# Patient Record
Sex: FEMALE | Race: WHITE | Employment: FULL TIME | URBAN - METROPOLITAN AREA
[De-identification: names, ages, dates, MRNs, and addresses within clinical notes are randomized per-mention and may not be internally consistent; named-entity substitution may affect disease eponyms.]

---

## 2017-02-27 ENCOUNTER — DOCUMENTATION ONLY (OUTPATIENT)
Dept: FAMILY MEDICINE | Facility: CLINIC | Age: 52
End: 2017-02-27

## 2017-02-27 NOTE — PROGRESS NOTES
Panel Management Review      Patient has the following on her problem list: None      Composite cancer screening  Chart review shows that this patient is due/due soon for the following Pap Smear and Mammogram  Summary:    Patient is due/failing the following:   MAMMOGRAM and PAP    Action needed:   Patient needs office visit for PAP & orders for mammogram.    Type of outreach:    Sent [employer] message.    Questions for provider review:    None                                                                                                                                    Gloria Osman CMA (AAMA) 2/27/2017 1:43 PM       Chart routed to  .

## 2017-04-05 NOTE — PROGRESS NOTES
SUBJECTIVE:     CC: Graeme Johnson is an 51 year old woman who presents for preventive health visit.     Physical   Annual:     Getting at least 3 servings of Calcium per day::  Yes    Bi-annual eye exam::  Yes    Dental care twice a year::  Yes    Sleep apnea or symptoms of sleep apnea::  Daytime drowsiness    Diet::  Regular (no restrictions)    Frequency of exercise::  6-7 days/week    Duration of exercise::  30-45 minutes    Taking medications regularly::  Yes    Medication side effects::  None    Additional concerns today::  YES        Discuss hip issues.    Today's PHQ-2 Score:   PHQ-2 ( 1999 Pfizer) 4/4/2017   Q1: Little interest or pleasure in doing things -   Q2: Feeling down, depressed or hopeless -   PHQ-2 Score -   Little interest or pleasure in doing things Not at all   Feeling down, depressed or hopeless Not at all   PHQ-2 Score 0       Abuse: Current or Past(Physical, Sexual or Emotional)- No  Do you feel safe in your environment - Yes    Social History   Substance Use Topics     Smoking status: Former Smoker     Quit date: 1/1/1979     Smokeless tobacco: Never Used      Comment: smoked a little as a teenager     Alcohol use 1.0 - 1.5 oz/week     2 - 3 Standard drinks or equivalent per week      Comment: 1/2 glass wine/day     The patient does not drink >3 drinks per day nor >7 drinks per week.    Recent Labs   Lab Test  12/24/14   0951   CHOL  184   HDL  98   LDL  74   TRIG  61   CHOLHDLRATIO  1.9       Reviewed orders with patient.  Reviewed health maintenance and updated orders accordingly - Yes    Mammo Decision Support:      Pertinent mammograms are reviewed under the imaging tab.  History of abnormal Pap smear: NO - age 30- 65 PAP every 3 years recommended    Reviewed and updated as needed this visit by clinical staff         Reviewed and updated as needed this visit by Provider        Patient Active Problem List   Diagnosis     CARDIOVASCULAR SCREENING; LDL GOAL LESS THAN 160     Allergic  state     Left hip pain     Left shoulder pain     Pain in joint, pelvic region and thigh     Ischial bursitis     Acetabular labrum tear     Primary osteoarthritis of left hip     AIN grade II       Past Medical History:   Diagnosis Date     AIN grade II      Allergy        Past Surgical History:   Procedure Laterality Date     APPENDECTOMY       BIOPSY BREAST       C NONSPECIFIC PROCEDURE      PE tubes     C NONSPECIFIC PROCEDURE      [place] teeth     COLONOSCOPY N/A 2015    Procedure: COMBINED COLONOSCOPY, SINGLE OR MULTIPLE BIOPSY/POLYPECTOMY BY BIOPSY;  Surgeon: Gonzalez Emmanuel MD, MD;  Location:  GI     ENT SURGERY      tonsilectomy 1969     SURGICAL HISTORY OF -       venous ablation right leg       Obstetric History       T3      TAB1   SAB0   E0   M0   L3       # Outcome Date GA Lbr Gonsalo/2nd Weight Sex Delivery Anes PTL Lv   4 TAB            3 Term            2 Term            1 Term                   Current Outpatient Prescriptions   Medication Sig Dispense Refill     fluticasone (FLONASE) 50 MCG/ACT nasal spray Spray 2 sprays into both nostrils daily 16 g 3     multivitamin, therapeutic with minerals (MULTI-VITAMIN) TABS Take 1 tablet by mouth daily 100 tablet 3     NEW MED Calcium supplement.  Unsure of dose       omega 3 1000 MG CAPS Take 1 g by mouth daily 90 capsule 0     Cetirizine HCl (ZYRTEC ALLERGY PO) Take  by mouth. 1 tablet daily as needed         Family History   Problem Relation Age of Onset     Alcohol/Drug Father      CANCER Father      skin     Hypertension Father      Lipids Father      Neurologic Disorder Father      TIA's; dementia     Respiratory Father      copd     HEART DISEASE Father      CHF; a. fib/brain hemorrhage     Hypertension Maternal Grandmother      HEART DISEASE Maternal Grandmother      brain hemorrhage     DIABETES Maternal Grandfather      C.A.D. Maternal Grandfather      CANCER Maternal Grandfather      Bladder     Psychotic Disorder  "Sister      depression       Social History   Substance Use Topics     Smoking status: Former Smoker     Quit date: 1/1/1979     Smokeless tobacco: Never Used      Comment: smoked a little as a teenager     Alcohol use 1.0 - 1.5 oz/week     2 - 3 Standard drinks or equivalent per week      Comment: 1 glass per week       Immunization History   Administered Date(s) Administered     TD (ADULT, 7+) 02/05/1998     TDAP Vaccine (Adacel) 05/04/2010       ROS:  C: NEGATIVE for fever, chills, change in weight  I: NEGATIVE for worrisome rashes, moles or lesions; does have skin check next week.  E: NEGATIVE for vision changes or irritation  ENT: NEGATIVE for ear, mouth and throat problems  R: NEGATIVE for significant cough or short of breath x see below.  B: NEGATIVE for masses, tenderness or discharge  CV: NEGATIVE for chest pain, palpitations or peripheral edema  GI: NEGATIVE for nausea, abdominal pain, heartburn, or change in bowel habits  : NEGATIVE for unusual urinary or vaginal symptoms. Regular periods.  M: NEGATIVE for significant arthralgias or myalgia. Once in awhile will get some right hip pain and affects gait. SI seems locked up at same time.   N: NEGATIVE for weakness, dizziness or paresthesias  P: NEGATIVE for changes in mood or affect     Coughing since January. Does have tickles in throat. Suspects allergy.  Has also had a couple URIs with residual cough as well, so not sure if this contributes.    Anticipates getting anal pap smears annually; believes it will be coming up soon. She will be following up with colorectal for this.       OBJECTIVE:     /58 (BP Location: Right arm, Patient Position: Chair, Cuff Size: Adult Regular)  Pulse 57  Temp 97.7  F (36.5  C) (Oral)  Resp 16  Ht 5' 8.25\" (1.734 m)  Wt 142 lb 3.2 oz (64.5 kg)  SpO2 100%  BMI 21.46 kg/m2  EXAM:  GENERAL: healthy, alert and no distress  EYES: Eyes grossly normal to inspection, PERRL and conjunctivae and sclerae normal  HENT: ear " canals and TM's normal, nose and mouth without ulcers or lesions  NECK: no adenopathy, no asymmetry, masses, or scars and thyroid normal to palpation  RESP: lungs clear to auscultation - no rales, rhonchi or wheezes  BREAST: normal without masses, tenderness or nipple discharge and no palpable axillary masses or adenopathy  CV: regular rates and rhythm, peripheral pulses strong and no peripheral edema  ABDOMEN: soft, nontender, no hepatosplenomegaly, no masses and bowel sounds normal   (female): normal female external genitalia, normal urethral meatus, vaginal mucosa pink, moist, well rugated, and normal cervix/adnexa/uterus without masses or discharge  MS: no gross musculoskeletal defects noted, no edema  SKIN: no suspicious lesions or rashes  NEURO: Normal strength and tone, mentation intact and speech normal  PSYCH: mentation appears normal, affect normal/bright    Recent Labs   Lab Test  12/24/14   0951   CHOL  184   HDL  98   LDL  74   TRIG  61   CHOLHDLRATIO  1.9     Reviewed colorectal note.    ASSESSMENT/PLAN:       Encounter for routine adult health examination without abnormal findings      AIN grade II  she does note she will go in soon for anal pap.    Cough  seems more in the throat area.   At this time, encourage regular use of her anti histamine and the Flonase. Discussed timing as well.    Hip pain, right  she is thinking of chiropractic. Could consider physical therapy.  Discussed regular exercise and core strengthening.    Screening for cervical cancer    - Pap imaged thin layer screen with HPV - recommended age 30 - 65 years (select HPV order below)  - HPV High Risk Types DNA Cervical    Encounter for screening mammogram for breast cancer    - *MA Screening Digital Bilateral; Future  - Glucose; Future    Need for hepatitis C screening test    - **Hepatitis C Screen Reflex to RNA FUTURE anytime; Future    CARDIOVASCULAR SCREENING; LDL GOAL LESS THAN 160    - Lipid Profile with reflex to direct  "LDL; Future      COUNSELING:  Reviewed preventive health counseling, as reflected in patient instructions       Regular exercise       Healthy diet/nutrition       Osteoporosis Prevention/Bone Health         reports that she quit smoking about 38 years ago. She has never used smokeless tobacco.    Estimated body mass index is 22.28 kg/(m^2) as calculated from the following:    Height as of 4/1/16: 5' 8\" (1.727 m).    Weight as of 4/1/16: 146 lb 8 oz (66.5 kg).       Counseling Resources:  ATP IV Guidelines  Pooled Cohorts Equation Calculator  Breast Cancer Risk Calculator  FRAX Risk Assessment  ICSI Preventive Guidelines  Dietary Guidelines for Americans, 2010  [employer] MyPlate  ASA Prophylaxis  Lung CA Screening    Nessa Contreras MD, MD  [facility] [place]  Answers for HPI/ROS submitted by the patient on 4/4/2017   Q1: Little interest or pleasure in doing things: 0=Not at all  Q2: Feeling down, depressed or hopeless: 0=Not at all  PHQ-2 Score: 0    "

## 2017-04-05 NOTE — PATIENT INSTRUCTIONS
Please call [phone] to schedule your mammogram at the [place] location of your choice.    Preventive Health Recommendations  Female Ages 50 - 64    Yearly exam: See your health care provider every year in order to  o Review health changes.   o Discuss preventive care.    o Review your medicines if your doctor has prescribed any.      Get a Pap test every three years (unless you have an abnormal result and your provider advises testing more often).    If you get Pap tests with HPV test, you only need to test every 5 years, unless you have an abnormal result.     You do not need a Pap test if your uterus was removed (hysterectomy) and you have not had cancer.    You should be tested each year for STDs (sexually transmitted diseases) if you're at risk.     Have a mammogram every 1 to 2 years.    Have a colonoscopy at age 50, or have a yearly FIT test (stool test). These exams screen for colon cancer.      Have a cholesterol test every 5 years, or more often if advised.    Have a diabetes test (fasting glucose) every three years. If you are at risk for diabetes, you should have this test more often.     If you are at risk for osteoporosis (brittle bone disease), think about having a bone density scan (DEXA).    Shots: Get a flu shot each year. Get a tetanus shot every 10 years.    Nutrition:     Eat at least 5 servings of fruits and vegetables each day.    Eat whole-grain bread, whole-wheat pasta and brown rice instead of white grains and rice.    Talk to your provider about Calcium and Vitamin D.     Lifestyle    Exercise at least 150 minutes a week (30 minutes a day, 5 days a week). This will help you control your weight and prevent disease.    Limit alcohol to one drink per day.    No smoking.     Wear sunscreen to prevent skin cancer.     See your dentist every six months for an exam and cleaning.    See your eye doctor every 1 to 2 years.

## 2017-04-06 ENCOUNTER — OFFICE VISIT (OUTPATIENT)
Dept: FAMILY MEDICINE | Facility: CLINIC | Age: 52
End: 2017-04-06
Payer: COMMERCIAL

## 2017-04-06 VITALS
TEMPERATURE: 97.7 F | DIASTOLIC BLOOD PRESSURE: 58 MMHG | OXYGEN SATURATION: 100 % | BODY MASS INDEX: 21.55 KG/M2 | SYSTOLIC BLOOD PRESSURE: 118 MMHG | WEIGHT: 142.2 LBS | HEIGHT: 68 IN | RESPIRATION RATE: 16 BRPM | HEART RATE: 57 BPM

## 2017-04-06 DIAGNOSIS — Z00.00 ENCOUNTER FOR ROUTINE ADULT HEALTH EXAMINATION WITHOUT ABNORMAL FINDINGS: Primary | ICD-10-CM

## 2017-04-06 DIAGNOSIS — M25.551 HIP PAIN, RIGHT: ICD-10-CM

## 2017-04-06 DIAGNOSIS — Z13.6 CARDIOVASCULAR SCREENING; LDL GOAL LESS THAN 160: ICD-10-CM

## 2017-04-06 DIAGNOSIS — Z11.59 NEED FOR HEPATITIS C SCREENING TEST: ICD-10-CM

## 2017-04-06 DIAGNOSIS — Z12.4 SCREENING FOR CERVICAL CANCER: ICD-10-CM

## 2017-04-06 DIAGNOSIS — Z12.31 ENCOUNTER FOR SCREENING MAMMOGRAM FOR BREAST CANCER: ICD-10-CM

## 2017-04-06 DIAGNOSIS — K62.82 AIN GRADE II: ICD-10-CM

## 2017-04-06 DIAGNOSIS — R05.9 COUGH: ICD-10-CM

## 2017-04-06 PROCEDURE — 87624 HPV HI-RISK TYP POOLED RSLT: CPT | Performed by: FAMILY MEDICINE

## 2017-04-06 PROCEDURE — 99396 PREV VISIT EST AGE 40-64: CPT | Performed by: FAMILY MEDICINE

## 2017-04-06 PROCEDURE — G0145 SCR C/V CYTO,THINLAYER,RESCR: HCPCS | Performed by: FAMILY MEDICINE

## 2017-04-06 NOTE — MR AVS SNAPSHOT
After Visit Summary   4/6/2017    Graeme Johnson    MRN: [id number]           Patient Information     Date Of Birth          1965        Visit Information        Provider Department      4/6/2017 8:10 AM Nessa Contreras MD [facility] [place]        Today's Diagnoses     Encounter for routine adult health examination without abnormal findings    -  1    Screening for cervical cancer        Encounter for screening mammogram for breast cancer        Need for hepatitis C screening test        CARDIOVASCULAR SCREENING; LDL GOAL LESS THAN 160          Care Instructions    Please call [phone] to schedule your mammogram at the [place] location of your choice.    Preventive Health Recommendations  Female Ages 50 - 64    Yearly exam: See your health care provider every year in order to  o Review health changes.   o Discuss preventive care.    o Review your medicines if your doctor has prescribed any.      Get a Pap test every three years (unless you have an abnormal result and your provider advises testing more often).    If you get Pap tests with HPV test, you only need to test every 5 years, unless you have an abnormal result.     You do not need a Pap test if your uterus was removed (hysterectomy) and you have not had cancer.    You should be tested each year for STDs (sexually transmitted diseases) if you're at risk.     Have a mammogram every 1 to 2 years.    Have a colonoscopy at age 50, or have a yearly FIT test (stool test). These exams screen for colon cancer.      Have a cholesterol test every 5 years, or more often if advised.    Have a diabetes test (fasting glucose) every three years. If you are at risk for diabetes, you should have this test more often.     If you are at risk for osteoporosis (brittle bone disease), think about having a bone density scan (DEXA).    Shots: Get a flu shot each year. Get a tetanus shot every 10 years.    Nutrition:     Eat at least 5 servings of  fruits and vegetables each day.    Eat whole-grain bread, whole-wheat pasta and brown rice instead of white grains and rice.    Talk to your provider about Calcium and Vitamin D.     Lifestyle    Exercise at least 150 minutes a week (30 minutes a day, 5 days a week). This will help you control your weight and prevent disease.    Limit alcohol to one drink per day.    No smoking.     Wear sunscreen to prevent skin cancer.     See your dentist every six months for an exam and cleaning.    See your eye doctor every 1 to 2 years.          Follow-ups after your visit        Future tests that were ordered for you today     Open Future Orders        Priority Expected Expires Ordered    Lipid Profile with reflex to direct LDL Routine  10/6/2017 4/6/2017    Glucose Routine  10/6/2017 4/6/2017    **Hepatitis C Screen Reflex to RNA FUTURE anytime Routine 4/6/2017 10/6/2017 4/6/2017    *MA Screening Digital Bilateral Routine  4/6/2018 4/6/2017            Who to contact     If you have questions or need follow up information about today's clinic visit or your schedule please contact [facility] directly at [phone].  Normal or non-critical lab and imaging results will be communicated to you by [employer], letter or phone within 4 business days after the clinic has received the results. If you do not hear from us within 7 days, please contact the clinic through [employer] or phone. If you have a critical or abnormal lab result, we will notify you by phone as soon as possible.  Submit refill requests through [employer] or call your pharmacy and they will forward the refill request to us. Please allow 3 business days for your refill to be completed.          Additional Information About Your Visit        [employer] Information     [employer] gives you secure access to your electronic health record. If you see a primary care provider, you can also send messages to your care team and make appointments. If you have questions,  "please call your primary care clinic.  If you do not have a primary care provider, please call [phone] and they will assist you.        Care EveryWhere ID     This is your Care EveryWhere ID. This could be used by other organizations to access your [place] medical records  [id number]        Your Vitals Were     Pulse Temperature Respirations Height Pulse Oximetry BMI (Body Mass Index)    57 97.7  F (36.5  C) (Oral) 16 5' 8.25\" (1.734 m) 100% 21.46 kg/m2       Blood Pressure from Last 3 Encounters:   04/06/17 118/58   04/01/16 100/64   11/02/15 96/68    Weight from Last 3 Encounters:   04/06/17 142 lb 3.2 oz (64.5 kg)   04/01/16 146 lb 8 oz (66.5 kg)   08/04/15 144 lb 3.2 oz (65.4 kg)              We Performed the Following     HPV High Risk Types DNA Cervical     Pap imaged thin layer screen with HPV - recommended age 30 - 65 years (select HPV order below)        Primary Care Provider Office Phone # Fax #    Nessa Contreras [phone]       [facility] [address] [facility] [id number]        Thank you!     Thank you for choosing [facility]  for your care. Our goal is always to provide you with excellent care. Hearing back from our patients is one way we can continue to improve our services. Please take a few minutes to complete the written survey that you may receive in the mail after your visit with us. Thank you!             Your Updated Medication List - Protect others around you: Learn how to safely use, store and throw away your medicines at www.disposemymeds.org.          This list is accurate as of: 4/6/17  8:49 AM.  Always use your most recent med list.                   Brand Name Dispense Instructions for use    fluticasone 50 MCG/ACT spray    FLONASE    16 g    Spray 2 sprays into both nostrils daily       Multi-vitamin Tabs tablet     100 tablet    Take 1 tablet by mouth daily       NEW MED      Calcium supplement.  Unsure of dose       omega 3 " 1000 MG Caps     90 capsule    Take 1 g by mouth daily       ZYRTEC ALLERGY PO      Take  by mouth. 1 tablet daily as needed

## 2017-04-06 NOTE — NURSING NOTE
"Chief Complaint   Patient presents with     Physical       Initial There were no vitals taken for this visit. Estimated body mass index is 22.28 kg/(m^2) as calculated from the following:    Height as of 4/1/16: 5' 8\" (1.727 m).    Weight as of 4/1/16: 146 lb 8 oz (66.5 kg).  Medication Reconciliation: complete   Yoselin Todd, TIFFANIE      "

## 2017-04-10 LAB
COPATH REPORT: NORMAL
PAP: NORMAL

## 2017-04-12 LAB
FINAL DIAGNOSIS: NORMAL
HPV HR 12 DNA CVX QL NAA+PROBE: NEGATIVE
HPV16 DNA SPEC QL NAA+PROBE: NEGATIVE
HPV18 DNA SPEC QL NAA+PROBE: NEGATIVE
SPECIMEN DESCRIPTION: NORMAL

## 2017-04-20 DIAGNOSIS — J30.2 SEASONAL ALLERGIC RHINITIS: ICD-10-CM

## 2017-04-21 NOTE — TELEPHONE ENCOUNTER
fluticasone (FLONASE) 50 MCG/ACT nasal spray      Last Written Prescription Date: 7/29/16  Last Fill Quantity: 16g,  # refills: 3   Last Office Visit with [facility], [facility] or [facility] prescribing provider: 4/6/17

## 2017-04-24 RX ORDER — FLUTICASONE PROPIONATE 50 MCG
SPRAY, SUSPENSION (ML) NASAL
Qty: 1 BOTTLE | Refills: 11 | Status: SHIPPED | OUTPATIENT
Start: 2017-04-24

## 2017-04-25 ENCOUNTER — RADIANT APPOINTMENT (OUTPATIENT)
Dept: MAMMOGRAPHY | Facility: CLINIC | Age: 52
End: 2017-04-25
Attending: FAMILY MEDICINE
Payer: COMMERCIAL

## 2017-04-25 DIAGNOSIS — Z12.31 ENCOUNTER FOR SCREENING MAMMOGRAM FOR BREAST CANCER: ICD-10-CM

## 2017-04-25 PROCEDURE — G0202 SCR MAMMO BI INCL CAD: HCPCS | Mod: TC

## 2017-06-19 ENCOUNTER — HOSPITAL PATHOLOGY (OUTPATIENT)
Dept: OTHER | Facility: CLINIC | Age: 52
End: 2017-06-19

## 2017-06-19 ENCOUNTER — TRANSFERRED RECORDS (OUTPATIENT)
Dept: HEALTH INFORMATION MANAGEMENT | Facility: CLINIC | Age: 52
End: 2017-06-19

## 2017-06-22 LAB — COPATH REPORT: NORMAL

## 2017-09-26 ENCOUNTER — OFFICE VISIT (OUTPATIENT)
Dept: FAMILY MEDICINE | Facility: CLINIC | Age: 52
End: 2017-09-26
Payer: COMMERCIAL

## 2017-09-26 VITALS
BODY MASS INDEX: 21.28 KG/M2 | SYSTOLIC BLOOD PRESSURE: 116 MMHG | OXYGEN SATURATION: 96 % | HEIGHT: 68 IN | HEART RATE: 60 BPM | DIASTOLIC BLOOD PRESSURE: 62 MMHG | RESPIRATION RATE: 16 BRPM | WEIGHT: 140.4 LBS | TEMPERATURE: 98.2 F

## 2017-09-26 DIAGNOSIS — Z23 NEED FOR PROPHYLACTIC VACCINATION AND INOCULATION AGAINST INFLUENZA: ICD-10-CM

## 2017-09-26 DIAGNOSIS — K46.9 ABDOMINAL HERNIA WITHOUT OBSTRUCTION AND WITHOUT GANGRENE, RECURRENCE NOT SPECIFIED, UNSPECIFIED HERNIA TYPE: Primary | ICD-10-CM

## 2017-09-26 PROCEDURE — 99213 OFFICE O/P EST LOW 20 MIN: CPT | Mod: 25 | Performed by: FAMILY MEDICINE

## 2017-09-26 PROCEDURE — 90686 IIV4 VACC NO PRSV 0.5 ML IM: CPT | Performed by: FAMILY MEDICINE

## 2017-09-26 PROCEDURE — 90471 IMMUNIZATION ADMIN: CPT | Performed by: FAMILY MEDICINE

## 2017-09-26 NOTE — NURSING NOTE
"Chief Complaint   Patient presents with     Consult     hernia       Initial /62 (BP Location: Right arm, Cuff Size: Adult Regular)  Pulse 60  Temp 98.2  F (36.8  C) (Oral)  Resp 16  Ht 5' 8.25\" (1.734 m)  Wt 140 lb 6.4 oz (63.7 kg)  SpO2 96%  BMI 21.19 kg/m2 Estimated body mass index is 21.19 kg/(m^2) as calculated from the following:    Height as of this encounter: 5' 8.25\" (1.734 m).    Weight as of this encounter: 140 lb 6.4 oz (63.7 kg).  Medication Reconciliation: complete   Yoselin Todd, CMA    "

## 2017-09-26 NOTE — PROGRESS NOTES
"  SUBJECTIVE:   Graeme Johnson is a 52 year old female who presents to clinic today for the following health issues:      Here to discuss hernia located above the belly button. Does bulge while standing and exercising.  Would like to discuss options for the hernia.        Problem list and histories reviewed & adjusted, as indicated.  Additional history:     See under ROS     Patient Active Problem List   Diagnosis     CARDIOVASCULAR SCREENING; LDL GOAL LESS THAN 160     Allergic state     Left hip pain     Left shoulder pain     Pain in joint, pelvic region and thigh     Ischial bursitis     Acetabular labrum tear     Primary osteoarthritis of left hip     AIN grade II       Current Outpatient Prescriptions   Medication Sig Dispense Refill     fluticasone (FLONASE) 50 MCG/ACT spray USE TWO SPRAY(S) IN EACH NOSTRIL ONCE DAILY 1 Bottle 11     multivitamin, therapeutic with minerals (MULTI-VITAMIN) TABS Take 1 tablet by mouth daily 100 tablet 3     NEW MED Calcium supplement.  Unsure of dose       omega 3 1000 MG CAPS Take 1 g by mouth daily 90 capsule 0     Cetirizine HCl (ZYRTEC ALLERGY PO) Take  by mouth. 1 tablet daily as needed           Reviewed and updated as needed this visit by clinical staff  Tobacco  Allergies  Med Hx  Surg Hx  Fam Hx  Soc Hx      Reviewed and updated as needed this visit by Provider         ROS:  CONSTITUTIONAL:NEGATIVE for fever, chills, change in weight  GI: NEGATIVE for nausea, abdominal pain, heartburn, or change in bowel habits  PSYCHIATRIC: NEGATIVE for changes in mood or affect    Hernia above belly button. Reducible. Seems out more than in.  More prominent with regular exercise; and posture training.  Does feel uncomfortable. Believes it may be getting bigger.       OBJECTIVE:     /62 (BP Location: Right arm, Cuff Size: Adult Regular)  Pulse 60  Temp 98.2  F (36.8  C) (Oral)  Resp 16  Ht 5' 8.25\" (1.734 m)  Wt 140 lb 6.4 oz (63.7 kg)  SpO2 96%  BMI 21.19 " kg/m2  Body mass index is 21.19 kg/(m^2).  GENERAL APPEARANCE: alert and no distress  ABDOMEN: there does appear to be a weakness/herniation in the abdominal musculature just above the umbilicus; with valsalva there is some mild protrusion  PSYCH: mentation appears normal and affect normal/bright        ASSESSMENT/PLAN:     Abdominal hernia without obstruction and without gangrene, recurrence not specified, unspecified hernia type  Discussed. I would think she would most likely be looking at a surgical repair.   I am not aware how successful bracing/truss would be, especially for her situation.  She can discuss with surgery.   - GENERAL SURG ADULT REFERRAL    Need for prophylactic vaccination and inoculation against influenza    - FLU VAC, SPLIT VIRUS IM > 3 YO (QUADRIVALENT) [50329]  - Vaccine Administration, Initial [97738]      Follow up prn or as previously directed.        Nessa Contreras MD, MD  [facility]

## 2017-09-26 NOTE — PROGRESS NOTES
Injectable Influenza Immunization Documentation    1.  Is the person to be vaccinated sick today?   No    2. Does the person to be vaccinated have an allergy to a component   of the vaccine?   No    3. Has the person to be vaccinated ever had a serious reaction   to influenza vaccine in the past?   No    4. Has the person to be vaccinated ever had Guillain-Barré syndrome?   No    Form completed by Yoselin Todd [facility]

## 2017-10-17 ENCOUNTER — OFFICE VISIT (OUTPATIENT)
Dept: SURGERY | Facility: CLINIC | Age: 52
End: 2017-10-17
Payer: COMMERCIAL

## 2017-10-17 VITALS
HEIGHT: 68 IN | DIASTOLIC BLOOD PRESSURE: 58 MMHG | WEIGHT: 142 LBS | SYSTOLIC BLOOD PRESSURE: 90 MMHG | HEART RATE: 55 BPM | BODY MASS INDEX: 21.52 KG/M2 | OXYGEN SATURATION: 98 %

## 2017-10-17 DIAGNOSIS — K43.9 EPIGASTRIC HERNIA: Primary | ICD-10-CM

## 2017-10-17 PROCEDURE — 99204 OFFICE O/P NEW MOD 45 MIN: CPT | Performed by: SURGERY

## 2017-10-17 NOTE — PROGRESS NOTES
"  HPI      ROS (Review of Systems):      Positive for System Review.  System Review has been done        Physical Exam      Assessment:    Graeme Johnson is seen in consultation for an epigastric hernia, at the request of Nessa Contreras MD, MD.    Primary, reducible epigastric hernia.    Plan:    We have discussed observation, reduction techniques and importance, incarceration and strangulation signs, symptoms and importance as well as need to seek emergency treatment.      We have discussed open epigastric hernia repair with mesh in detail, including benefits, alternatives, complications, incision, scar, mesh, infection, anesthesia, bleeding, blood transfusion, DVT, PE, hernia recurrence, lifting and activity limits after surgery.  All questions have been answered to the best of my ability.    She has been given literature to review.   We will schedule surgery with choice anesthesia when and if the patient elects.  I recommend one week off of work postop.      HPI:  Graeme is a 52 year old female who presents for evaluation of a lump in the epigastric region.  She first noticed it an unknown time ago.  She complains of intermittent pain \"discomfort\" with standing with good posture and after exercise.  Negative for associated symptoms of nausea and vomiting.  She has not had previous surgery in this location.  She has not had a previous herniorrhaphy in this location.  Her employment does not require heavy lifting, deskwork.  She occasionally lifts her grandchild and for exercise.    Constipation: No  Cough: No  Diabetes: No  Current Smoker: No    Past Medical History:   has a past medical history of AIN grade II and Allergy.    Past Surgical History:  Past Surgical History:   Procedure Laterality Date     APPENDECTOMY       BIOPSY BREAST  2012     C NONSPECIFIC PROCEDURE      PE tubes     C NONSPECIFIC PROCEDURE      [place] teeth     COLONOSCOPY N/A 11/2/2015    Procedure: COMBINED COLONOSCOPY, SINGLE OR MULTIPLE " "BIOPSY/POLYPECTOMY BY BIOPSY;  Surgeon: Gonzalez Emmanuel MD, MD;  Location:  GI     ENT SURGERY      tonsilectomy 1969     SURGICAL HISTORY OF -   2011    venous ablation right leg        Social History:  Social History     Social History     Marital status:      Spouse name: N/A     Number of children: N/A     Years of education: N/A     Occupational History     Clerical      Social History Main Topics     Smoking status: Former Smoker     Quit date: 1/1/1979     Smokeless tobacco: Never Used      Comment: smoked a little as a teenager     Alcohol use 1.0 - 1.5 oz/week     2 - 3 Standard drinks or equivalent per week      Comment: 1 glass per week     Drug use: No     Sexual activity: Yes     Partners: Male     Birth control/ protection: Surgical      Comment: vasectomy     Other Topics Concern     Special Diet No     Gets some variety.     Exercise No     will go in spurts     Parent/Sibling W/ Cabg, Mi Or Angioplasty Before 65f 55m? No     Social History Narrative        Family History:  Family History   Problem Relation Age of Onset     Alcohol/Drug Father      CANCER Father      skin     Hypertension Father      Lipids Father      Neurologic Disorder Father      TIA's; dementia     Respiratory Father      copd     HEART DISEASE Father      CHF; a. fib/brain hemorrhage     Hypertension Maternal Grandmother      HEART DISEASE Maternal Grandmother      brain hemorrhage     DIABETES Maternal Grandfather      C.A.D. Maternal Grandfather      CANCER Maternal Grandfather      Bladder     Psychotic Disorder Sister      depression     Family history reviewed and not pertinent.    ROS:  The 10 point review of systems is negative other than noted in the HPI and above.    PE:    Vitals: BP 90/58 (BP Location: Right arm, Cuff Size: Adult Regular)  Pulse 55  Ht 5' 8\" (1.727 m)  Wt 142 lb (64.4 kg)  SpO2 98%  Breastfeeding? No  BMI 21.59 kg/m2  BMI= Body mass index is 21.59 kg/(m^2).  General - Well developed, " well nourished female in no apparent distress  HEENT:  Head normocephalic and atraumatic, pupils equal and round, conjunctivae clear, no scleral icterus, mucous membranes moist, external ears and nose normal  Neck: Supple without thyromegaly or masses  Lymphatic: No cervical, or supraclavicular lymphadenopathy  Lungs: Clear to auscultation bilaterally  Heart: Regular rate and rhythm, no murmurs  Abdomen:  abdomen is soft without significant tenderness, masses, organomegaly or guarding   Hernia:  epigastric, just cephalad to the umbilicus, 1 cm, reducible, mildly tender  Extremities: Warm without edema  Musculoskeletal:  Normal station and gait  Neurologic: alert, speech is clear, moves all extremities with good strength  Psychiatric: Mood and affect appropriate  Skin: Without lesions or rashes, or juandice    This note was created using voice recognition software. Undetected word substitutions or other errors may have occurred.     Time spent with the patient with greater that 50% of the time in discussion was 20 minutes.     Luanne Damian MD    Please route or send letter to:  Primary Care Provider (PCP) and Referring Provider

## 2017-10-17 NOTE — LETTER
"2017    Re: Graeme Johnson - 1965    Graeme Johnson is seen in consultation for an epigastric hernia, at the request of Nessa Contreras MD,     Primary, reducible epigastric hernia.     Plan:    We have discussed observation, reduction techniques and importance, incarceration and strangulation signs, symptoms and importance as well as need to seek emergency treatment.       We have discussed open epigastric hernia repair with mesh in detail, including benefits, alternatives, complications, incision, scar, mesh, infection, anesthesia, bleeding, blood transfusion, DVT, PE, hernia recurrence, lifting and activity limits after surgery.  All questions have been answered to the best of my ability.     She has been given literature to review.   We will schedule surgery with choice anesthesia when and if the patient elects.  I recommend one week off of work postop.        HPI:  Graeme is a 52 year old female who presents for evaluation of a lump in the epigastric region.  She first noticed it an unknown time ago.  She complains of intermittent pain \"discomfort\" with standing with good posture and after exercise.  Negative for associated symptoms of nausea and vomiting.  She has not had previous surgery in this location.  She has not had a previous herniorrhaphy in this location.  Her employment does not require heavy lifting, deskwork.  She occasionally lifts her grandchild and for exercise.     Constipation: No  Cough: No  Diabetes: No  Current Smoker: No     Past Medical History:   has a past medical history of AIN grade II and Allergy.     Past Surgical History:    Family history reviewed and not pertinent.     ROS:  The 10 point review of systems is negative other than noted in the HPI and above.     PE:    Vitals: BP 90/58 (BP Location: Right arm, Cuff Size: Adult Regular)  Pulse 55  Ht 5' 8\" (1.727 m)  Wt 142 lb (64.4 kg)  SpO2 98%  Breastfeeding? No  BMI 21.59 kg/m2  BMI= Body mass index is " 21.59 kg/(m^2).  General - Well developed, well nourished female in no apparent distress  HEENT:  Head normocephalic and atraumatic, pupils equal and round, conjunctivae clear, no scleral icterus, mucous membranes moist, external ears and nose normal  Neck: Supple without thyromegaly or masses  Lymphatic: No cervical, or supraclavicular lymphadenopathy  Lungs: Clear to auscultation bilaterally  Heart: Regular rate and rhythm, no murmurs  Abdomen:  abdomen is soft without significant tenderness, masses, organomegaly or guarding                        Hernia:  epigastric, just cephalad to the umbilicus, 1 cm, reducible, mildly tender  Extremities: Warm without edema  Musculoskeletal:  Normal station and gait  Neurologic: alert, speech is clear, moves all extremities with good strength  Psychiatric: Mood and affect appropriate  Skin: Without lesions or rashes, or juandice     This note was created using voice recognition software. Undetected word substitutions or other errors may have occurred.            Luanne Damian MD

## 2017-10-17 NOTE — MR AVS SNAPSHOT
"              After Visit Summary   10/17/2017    Graeme Johnson    MRN: [id number]           Patient Information     Date Of Birth          1965        Visit Information        Provider Department      10/17/2017 4:30 PM Luanne Damian MD [facility]      Today's Diagnoses     Epigastric hernia    -  1       Follow-ups after your visit        Who to contact     If you have questions or need follow up information about today's clinic visit or your schedule please contact [facility] directly at [phone].  Normal or non-critical lab and imaging results will be communicated to you by ithinksporthart, letter or phone within 4 business days after the clinic has received the results. If you do not hear from us within 7 days, please contact the clinic through [employer] or phone. If you have a critical or abnormal lab result, we will notify you by phone as soon as possible.  Submit refill requests through [employer] or call your pharmacy and they will forward the refill request to us. Please allow 3 business days for your refill to be completed.          Additional Information About Your Visit        MyChart Information     [employer] gives you secure access to your electronic health record. If you see a primary care provider, you can also send messages to your care team and make appointments. If you have questions, please call your primary care clinic.  If you do not have a primary care provider, please call [phone] and they will assist you.        Care EveryWhere ID     This is your Care EveryWhere ID. This could be used by other organizations to access your [place] medical records  [id number]        Your Vitals Were     Pulse Height Pulse Oximetry Breastfeeding? BMI (Body Mass Index)       55 5' 8\" (1.727 m) 98% No 21.59 kg/m2        Blood Pressure from Last 3 Encounters:   10/17/17 90/58   09/26/17 116/62   04/06/17 118/58    " Weight from Last 3 Encounters:   10/17/17 142 lb (64.4 kg)   09/26/17 140 lb 6.4 oz (63.7 kg)   04/06/17 142 lb 3.2 oz (64.5 kg)              Today, you had the following     No orders found for display       Primary Care Provider Office Phone # Fax #    Nessa Contreras [phone] [id number] [address] [facility] [address]        Equal Access to Services     KEYA MURRAY : Hadii aad ku hadasho Soomaali, waaxda luqadaha, qaybta kaalmada adeegyada, waxay idiin hayaan adeeg kharash la'aan . So [facility] [phone].    ATENCIÓN: Si habla español, tiene a romero disposición servicios gratuitos de asistencia lingüística. [facility] [phone].    We comply with applicable federal civil rights laws and [place] laws. We do not discriminate on the basis of race, color, national origin, age, disability, sex, sexual orientation, or gender identity.            Thank you!     Thank you for choosing [facility]  for your care. Our goal is always to provide you with excellent care. Hearing back from our patients is one way we can continue to improve our services. Please take a few minutes to complete the written survey that you may receive in the mail after your visit with us. Thank you!             Your Updated Medication List - Protect others around you: Learn how to safely use, store and throw away your medicines at www.disposemymeds.org.          This list is accurate as of: 10/17/17 11:59 PM.  Always use your most recent med list.                   Brand Name Dispense Instructions for use Diagnosis    fluticasone 50 MCG/ACT spray    FLONASE    1 Bottle    USE TWO SPRAY(S) IN EACH NOSTRIL ONCE DAILY    Seasonal allergic rhinitis       Multi-vitamin Tabs tablet     100 tablet    Take 1 tablet by mouth daily        NEW MED      Calcium supplement.  Unsure of dose        omega 3 1000 MG Caps     90 capsule    Take 1 g by mouth daily        ZYRTEC ALLERGY PO      Take  by mouth. 1 tablet daily as  needed

## 2017-10-20 RX ORDER — CEFAZOLIN SODIUM 1 G/3ML
1 INJECTION, POWDER, FOR SOLUTION INTRAMUSCULAR; INTRAVENOUS SEE ADMIN INSTRUCTIONS
Status: CANCELLED | OUTPATIENT
Start: 2017-10-20

## 2017-10-20 RX ORDER — CEFAZOLIN SODIUM 2 G/100ML
2 INJECTION, SOLUTION INTRAVENOUS
Status: CANCELLED | OUTPATIENT
Start: 2017-10-20

## 2017-11-06 ENCOUNTER — OFFICE VISIT (OUTPATIENT)
Dept: FAMILY MEDICINE | Facility: CLINIC | Age: 52
End: 2017-11-06
Payer: COMMERCIAL

## 2017-11-06 VITALS
RESPIRATION RATE: 16 BRPM | OXYGEN SATURATION: 99 % | TEMPERATURE: 98.2 F | HEART RATE: 54 BPM | BODY MASS INDEX: 21.66 KG/M2 | WEIGHT: 142.9 LBS | DIASTOLIC BLOOD PRESSURE: 56 MMHG | SYSTOLIC BLOOD PRESSURE: 98 MMHG | HEIGHT: 68 IN

## 2017-11-06 DIAGNOSIS — K43.9 EPIGASTRIC HERNIA: ICD-10-CM

## 2017-11-06 DIAGNOSIS — Z01.818 PREOP GENERAL PHYSICAL EXAM: Primary | ICD-10-CM

## 2017-11-06 DIAGNOSIS — Z11.59 NEED FOR HEPATITIS C SCREENING TEST: ICD-10-CM

## 2017-11-06 LAB
ERYTHROCYTE [DISTWIDTH] IN BLOOD BY AUTOMATED COUNT: 16.5 % (ref 10–15)
HCT VFR BLD AUTO: 36.4 % (ref 35–47)
HGB BLD-MCNC: 11.5 G/DL (ref 11.7–15.7)
MCH RBC QN AUTO: 27.3 PG (ref 26.5–33)
MCHC RBC AUTO-ENTMCNC: 31.6 G/DL (ref 31.5–36.5)
MCV RBC AUTO: 87 FL (ref 78–100)
PLATELET # BLD AUTO: 182 10E9/L (ref 150–450)
RBC # BLD AUTO: 4.21 10E12/L (ref 3.8–5.2)
WBC # BLD AUTO: 5.5 10E9/L (ref 4–11)

## 2017-11-06 PROCEDURE — 36415 COLL VENOUS BLD VENIPUNCTURE: CPT | Performed by: FAMILY MEDICINE

## 2017-11-06 PROCEDURE — 99214 OFFICE O/P EST MOD 30 MIN: CPT | Performed by: FAMILY MEDICINE

## 2017-11-06 PROCEDURE — 86803 HEPATITIS C AB TEST: CPT | Performed by: FAMILY MEDICINE

## 2017-11-06 PROCEDURE — 85027 COMPLETE CBC AUTOMATED: CPT | Performed by: FAMILY MEDICINE

## 2017-11-06 NOTE — PATIENT INSTRUCTIONS
Before Your Surgery      Call your surgeon if there is any change in your health. This includes signs of a cold or flu (such as a sore throat, runny nose, cough, rash or fever).    Do not smoke, drink alcohol or take over the counter medicine (unless your surgeon or primary care doctor tells you to) for the 24 hours before and after surgery.    If you take prescribed drugs: Follow your doctor s orders about which medicines to take and which to stop until after surgery.    Eating and drinking prior to surgery: follow the instructions from your surgeon    Take a shower or bath the night before surgery. Use the soap your surgeon gave you to gently clean your skin. If you do not have soap from your surgeon, use your regular soap. Do not shave or scrub the surgery site.  Wear clean pajamas and have clean sheets on your bed.       --------------------------------------------------------    Hold fish oil until after surgery.    Hold ibuprofen for a couple days prior to surgery.  Tylenol is OK.     No need to take any of your medications prior to surgery.

## 2017-11-06 NOTE — NURSING NOTE
"Chief Complaint   Patient presents with     Pre-Op Exam       Initial BP 98/56 (BP Location: Right arm, Cuff Size: Adult Regular)  Pulse 54  Temp 98.2  F (36.8  C) (Oral)  Resp 16  Ht 5' 8\" (1.727 m)  Wt 142 lb 14.4 oz (64.8 kg)  SpO2 99%  BMI 21.73 kg/m2 Estimated body mass index is 21.73 kg/(m^2) as calculated from the following:    Height as of this encounter: 5' 8\" (1.727 m).    Weight as of this encounter: 142 lb 14.4 oz (64.8 kg).  Medication Reconciliation: complete   Yoselin Todd, TIFFANIE    "

## 2017-11-06 NOTE — PROGRESS NOTES
[facility]  [address] [address] [facility] [id number] [phone]  Dept: [phone]    PRE-OP EVALUATION:  Today's date: 2017    Graeme Johnson (: 1965) presents for pre-operative evaluation assessment as requested by Dr. Damian.  She requires evaluation and anesthesia risk assessment prior to undergoing surgery/procedure for treatment of abdominal  .  Proposed procedure: hernia repair    Date of Surgery/ Procedure: 2017  Time of Surgery/ Procedure: 7:30  Hospital/Surgical Facility: [facility]    Primary Physician: Nessa Contreras  Type of Anesthesia Anticipated: General    Patient has a Health Care Directive or Living Will:  NO    Preop Questions 2017   1.  Do you have a history of heart attack, stroke, stent, bypass or surgery on an artery in the head, neck, heart or legs? No   2.  Do you ever have any pain or discomfort in your chest? No   3.  Do you have a history of  Heart Failure? No   4.   Are you troubled by shortness of breath when:  walking on a level surface, or up a slight hill, or at night? No   5.  Do you currently have a cold, bronchitis or other respiratory infection? No   6.  Do you have a cough, shortness of breath, or wheezing? No   7.  Do you sometimes get pains in the calves of your legs when you walk? No   8. Do you or anyone in your family have previous history of blood clots? No   9.  Do you or does anyone in your family have a serious bleeding problem such as prolonged bleeding following surgeries or cuts? No   10. Have you ever had problems with anemia or been told to take iron pills? No   11. Have you had any abnormal blood loss such as black, tarry or bloody stools, or abnormal vaginal bleeding? No   12. Have you ever had a blood transfusion? No   13. Have you or any of your relatives ever had problems with anesthesia? No   14. Do you have sleep apnea, excessive snoring or daytime drowsiness? No   15. Do you have any  prosthetic heart valves? No   16. Do you have prosthetic joints? No   17. Is there any chance that you may be pregnant? No           HPI:                                                      Brief HPI related to upcoming procedure: symptomatic epigastric hernia.      See problem list for active medical problems.  Problems all longstanding and stable, except as noted/documented.  See ROS for pertinent symptoms related to these conditions.                                                                                                  .    MEDICAL HISTORY:                                                    Patient Active Problem List    Diagnosis Date Noted     AIN grade II      Priority: Medium     Acetabular labrum tear 03/03/2014     Priority: Medium     Primary osteoarthritis of left hip 03/03/2014     Priority: Medium     Ischial bursitis 02/07/2014     Priority: Medium     Pain in joint, pelvic region and thigh 12/17/2013     Priority: Medium     Left hip pain 12/06/2013     Priority: Medium     Left shoulder pain 12/06/2013     Priority: Medium     Allergic state      Priority: Medium     (Problem list name updated by automated process. Provider to review and confirm.)       CARDIOVASCULAR SCREENING; LDL GOAL LESS THAN 160 02/10/2010     Priority: Medium      Past Medical History:   Diagnosis Date     AIN grade II      Allergy      Past Surgical History:   Procedure Laterality Date     APPENDECTOMY       BIOPSY BREAST  2012     C NONSPECIFIC PROCEDURE      PE tubes     C NONSPECIFIC PROCEDURE      [place] teeth     COLONOSCOPY N/A 11/2/2015    Procedure: COMBINED COLONOSCOPY, SINGLE OR MULTIPLE BIOPSY/POLYPECTOMY BY BIOPSY;  Surgeon: Gonzalez Emmanuel MD, MD;  Location:  GI     ENT SURGERY      tonsilectomy 1969     SURGICAL HISTORY OF -   2011    venous ablation right leg     Current Outpatient Prescriptions   Medication Sig Dispense Refill     fluticasone (FLONASE) 50 MCG/ACT spray USE TWO SPRAY(S) IN EACH  NOSTRIL ONCE DAILY 1 Bottle 11     multivitamin, therapeutic with minerals (MULTI-VITAMIN) TABS Take 1 tablet by mouth daily 100 tablet 3     NEW MED Calcium supplement.  Unsure of dose       omega 3 1000 MG CAPS Take 1 g by mouth daily 90 capsule 0     Cetirizine HCl (ZYRTEC ALLERGY PO) Take  by mouth. 1 tablet daily as needed       OTC products: None, except as noted above  Occasional ibuprofen.    Allergies   Allergen Reactions     No Known Drug Allergies       Latex Allergy: NO    Social History   Substance Use Topics     Smoking status: Former Smoker     Quit date: 1/1/1979     Smokeless tobacco: Never Used      Comment: smoked a little as a teenager     Alcohol use 1.0 - 1.5 oz/week     2 - 3 Standard drinks or equivalent per week      Comment: 1 glass per week     History   Drug Use No       REVIEW OF SYSTEMS:                                                    C: NEGATIVE for fever, chills, change in weight  E/M: NEGATIVE for ear, mouth and throat problems  R: NEGATIVE for significant cough or SOB  CV: NEGATIVE for chest pain, palpitations or peripheral edema  No nausea, vomitting or change in bowel habits.  No urinary symptoms.      EXAM:                                                    There were no vitals taken for this visit.  GENERAL APPEARANCE: healthy, alert and no distress  HENT: ear canals and TM's normal and nose and mouth without ulcers or lesions  RESP: lungs clear to auscultation - no rales, rhonchi or wheezes  CV: regular rate and rhythm  ABDOMEN: soft, nontender, no HSM or masses and bowel sounds normal  MS: no ankle edema  NEURO: Normal strength and tone, sensory exam grossly normal, mentation intact and speech normal  PSYCH: mentation appears normal and affect normal/bright    DIAGNOSTICS:                                                      Labs Resulted Today:   Results for orders placed or performed in visit on 11/06/17   CBC with platelets   Result Value Ref Range    WBC 5.5 4.0 - 11.0  10e9/L    RBC Count 4.21 3.8 - 5.2 10e12/L    Hemoglobin 11.5 (L) 11.7 - 15.7 g/dL    Hematocrit 36.4 35.0 - 47.0 %    MCV 87 78 - 100 fl    MCH 27.3 26.5 - 33.0 pg    MCHC 31.6 31.5 - 36.5 g/dL    RDW 16.5 (H) 10.0 - 15.0 %    Platelet Count 182 150 - 450 10e9/L       Recent Labs   Lab Test  04/01/16   1654  11/26/13   0944  03/25/11   1014   HGB  12.1  12.2   --    PLT  154  194   --    NA   --    --   142   POTASSIUM   --    --   4.1   CR   --    --   0.72        IMPRESSION:                                                    Reason for surgery/procedure: epigastric hernia.     The proposed surgical procedure is considered INTERMEDIATE risk.    REVISED CARDIAC RISK INDEX  The patient has the following serious cardiovascular risks for perioperative complications such as (MI, PE, VFib and 3  AV Block):  No serious cardiac risks  INTERPRETATION: 0 risks: Class I (very low risk - 0.4% complication rate)    The patient has the following additional risks for perioperative complications:  No identified additional risks       Preop general physical exam    - CBC with platelets    Epigastric hernia  Anticipating surgery.    Need for hepatitis C screening test    - **Hepatitis C Screen Reflex to RNA FUTURE anytime      RECOMMENDATIONS:                                                          --Patient is not to take any scheduled medications on the day of surgery     Anticoagulant or Antiplatelet Medication Use  NSAIDS: Ibuprofen (Motrin):         Stop one day prior to surgery  Hold fish oil.           APPROVAL GIVEN to proceed with proposed procedure, without further diagnostic evaluation       Signed Electronically by: Nessa Contreras MD, MD    Copy of this evaluation report is provided to requesting physician.    [facility] Preop Guidelines

## 2017-11-06 NOTE — MR AVS SNAPSHOT
After Visit Summary   11/6/2017    Graeme Johnson    MRN: [id number]           Patient Information     Date Of Birth          1965        Visit Information        Provider Department      11/6/2017 3:50 PM Nessa Contreras MD [facility]        Today's Diagnoses     Preop general physical exam    -  1      Care Instructions      Before Your Surgery      Call your surgeon if there is any change in your health. This includes signs of a cold or flu (such as a sore throat, runny nose, cough, rash or fever).    Do not smoke, drink alcohol or take over the counter medicine (unless your surgeon or primary care doctor tells you to) for the 24 hours before and after surgery.    If you take prescribed drugs: Follow your doctor s orders about which medicines to take and which to stop until after surgery.    Eating and drinking prior to surgery: follow the instructions from your surgeon    Take a shower or bath the night before surgery. Use the soap your surgeon gave you to gently clean your skin. If you do not have soap from your surgeon, use your regular soap. Do not shave or scrub the surgery site.  Wear clean pajamas and have clean sheets on your bed.       --------------------------------------------------------    Hold fish oil until after surgery.    Hold ibuprofen for a couple days prior to surgery.  Tylenol is OK.     No need to take any of your medications prior to surgery.            Follow-ups after your visit        Your next 10 appointments already scheduled     Nov 13, 2017   Procedure with Luanne Damian MD   [facility] (--)    [address] [facility] [address]   [phone]            Nov 13, 2017  7:30 AM [facility] Same Day Surgery with Luanne Damian MD, Mati Whaley PA-C   [facility] ([facility])    [facility] ([facility])   [phone]              Who to contact     If you have questions or need follow up information about today's clinic visit or your schedule please contact [facility] directly at [phone].  Normal or non-critical lab and imaging results will be communicated to you by MyChart, letter or phone within 4 business days after the clinic has received the results. If you do not hear from us within 7 days, please contact the clinic through MyChart or phone. If you have a critical or abnormal lab result, we will notify you by phone as soon as possible.  Submit refill requests through [employer] or call your pharmacy and they will forward the refill request to us. Please allow 3 business days for your refill to be completed.          Additional Information About Your Visit        [employer] Information     [employer] gives you secure access to your electronic health record. If you see a primary care provider, you can also send messages to your care team and make appointments. If you have questions, please call your primary care clinic.  If you do not have a primary care provider, please call [phone] and they will assist you.        Care EveryWhere ID     This is your Care EveryWhere ID. This could be used by other organizations to access your [place] medical records  [id number]        Your Vitals Were     Pulse Temperature Respirations Height Pulse Oximetry BMI (Body Mass Index)    54 98.2  F (36.8  C) (Oral) 16 5' 8\" (1.727 m) 99% 21.73 kg/m2       Blood Pressure from Last 3 Encounters:   11/06/17 98/56   10/17/17 90/58   09/26/17 116/62    Weight from Last 3 Encounters:   11/06/17 142 lb 14.4 oz (64.8 kg)   10/17/17 142 lb (64.4 kg)   09/26/17 140 lb 6.4 oz (63.7 kg)              Today, you had the following     No orders found for display       Primary Care Provider Office Phone # Fax #    Nessa Contreras [phone]       [address]  [facility] [address]        Equal " Access to Services     [facility]: Hadii aad ku hadloydapetar Erikamarleni, wabhavnada luqadaha, qaybta kahugoaguila garcia. So [facility] [phone].    ATENCIÓN: Si habla español, tiene a romero disposición servicios gratuitos de asistencia lingüística. Llame al [phone].    We comply with applicable federal civil rights laws and [place] laws. We do not discriminate on the basis of race, color, national origin, age, disability, sex, sexual orientation, or gender identity.            Thank you!     Thank you for choosing [facility]  for your care. Our goal is always to provide you with excellent care. Hearing back from our patients is one way we can continue to improve our services. Please take a few minutes to complete the written survey that you may receive in the mail after your visit with us. Thank you!             Your Updated Medication List - Protect others around you: Learn how to safely use, store and throw away your medicines at www.disposemymeds.org.          This list is accurate as of: 11/6/17  4:18 PM.  Always use your most recent med list.                   Brand Name Dispense Instructions for use Diagnosis    fluticasone 50 MCG/ACT spray    FLONASE    1 Bottle    USE TWO SPRAY(S) IN EACH NOSTRIL ONCE DAILY    Seasonal allergic rhinitis       Multi-vitamin Tabs tablet     100 tablet    Take 1 tablet by mouth daily        NEW MED      Calcium supplement.  Unsure of dose        omega 3 1000 MG Caps     90 capsule    Take 1 g by mouth daily        ZYRTEC ALLERGY PO      Take  by mouth. 1 tablet daily as needed

## 2017-11-09 LAB — HCV AB SERPL QL IA: NONREACTIVE

## 2017-11-10 NOTE — H&P (VIEW-ONLY)
[facility]  [address] [facility] [address] [phone]  Dept: [phone]    PRE-OP EVALUATION:  Today's date: 2017    Graeme Johnson (: 1965) presents for pre-operative evaluation assessment as requested by Dr. Damian.  She requires evaluation and anesthesia risk assessment prior to undergoing surgery/procedure for treatment of abdominal  .  Proposed procedure: hernia repair    Date of Surgery/ Procedure: 2017  Time of Surgery/ Procedure: 7:30  Hospital/Surgical Facility: [facility]    Primary Physician: Nessa Contreras  Type of Anesthesia Anticipated: General    Patient has a Health Care Directive or Living Will:  NO    Preop Questions 2017   1.  Do you have a history of heart attack, stroke, stent, bypass or surgery on an artery in the head, neck, heart or legs? No   2.  Do you ever have any pain or discomfort in your chest? No   3.  Do you have a history of  Heart Failure? No   4.   Are you troubled by shortness of breath when:  walking on a level surface, or up a slight hill, or at night? No   5.  Do you currently have a cold, bronchitis or other respiratory infection? No   6.  Do you have a cough, shortness of breath, or wheezing? No   7.  Do you sometimes get pains in the calves of your legs when you walk? No   8. Do you or anyone in your family have previous history of blood clots? No   9.  Do you or does anyone in your family have a serious bleeding problem such as prolonged bleeding following surgeries or cuts? No   10. Have you ever had problems with anemia or been told to take iron pills? No   11. Have you had any abnormal blood loss such as black, tarry or bloody stools, or abnormal vaginal bleeding? No   12. Have you ever had a blood transfusion? No   13. Have you or any of your relatives ever had problems with anesthesia? No   14. Do you have sleep apnea, excessive snoring or daytime drowsiness? No   15. Do you have any  prosthetic heart valves? No   16. Do you have prosthetic joints? No   17. Is there any chance that you may be pregnant? No           HPI:                                                      Brief HPI related to upcoming procedure: symptomatic epigastric hernia.      See problem list for active medical problems.  Problems all longstanding and stable, except as noted/documented.  See ROS for pertinent symptoms related to these conditions.                                                                                                  .    MEDICAL HISTORY:                                                    Patient Active Problem List    Diagnosis Date Noted     AIN grade II      Priority: Medium     Acetabular labrum tear 03/03/2014     Priority: Medium     Primary osteoarthritis of left hip 03/03/2014     Priority: Medium     Ischial bursitis 02/07/2014     Priority: Medium     Pain in joint, pelvic region and thigh 12/17/2013     Priority: Medium     Left hip pain 12/06/2013     Priority: Medium     Left shoulder pain 12/06/2013     Priority: Medium     Allergic state      Priority: Medium     (Problem list name updated by automated process. Provider to review and confirm.)       CARDIOVASCULAR SCREENING; LDL GOAL LESS THAN 160 02/10/2010     Priority: Medium      Past Medical History:   Diagnosis Date     AIN grade II      Allergy      Past Surgical History:   Procedure Laterality Date     APPENDECTOMY       BIOPSY BREAST  2012     C NONSPECIFIC PROCEDURE      PE tubes     C NONSPECIFIC PROCEDURE      [place] teeth     COLONOSCOPY N/A 11/2/2015    Procedure: COMBINED COLONOSCOPY, SINGLE OR MULTIPLE BIOPSY/POLYPECTOMY BY BIOPSY;  Surgeon: Gonzalez Emmanuel MD, MD;  Location:  GI     ENT SURGERY      tonsilectomy 1969     SURGICAL HISTORY OF -   2011    venous ablation right leg     Current Outpatient Prescriptions   Medication Sig Dispense Refill     fluticasone (FLONASE) 50 MCG/ACT spray USE TWO SPRAY(S) IN EACH  NOSTRIL ONCE DAILY 1 Bottle 11     multivitamin, therapeutic with minerals (MULTI-VITAMIN) TABS Take 1 tablet by mouth daily 100 tablet 3     NEW MED Calcium supplement.  Unsure of dose       omega 3 1000 MG CAPS Take 1 g by mouth daily 90 capsule 0     Cetirizine HCl (ZYRTEC ALLERGY PO) Take  by mouth. 1 tablet daily as needed       OTC products: None, except as noted above  Occasional ibuprofen.    Allergies   Allergen Reactions     No Known Drug Allergies       Latex Allergy: NO    Social History   Substance Use Topics     Smoking status: Former Smoker     Quit date: 1/1/1979     Smokeless tobacco: Never Used      Comment: smoked a little as a teenager     Alcohol use 1.0 - 1.5 oz/week     2 - 3 Standard drinks or equivalent per week      Comment: 1 glass per week     History   Drug Use No       REVIEW OF SYSTEMS:                                                    C: NEGATIVE for fever, chills, change in weight  E/M: NEGATIVE for ear, mouth and throat problems  R: NEGATIVE for significant cough or SOB  CV: NEGATIVE for chest pain, palpitations or peripheral edema  No nausea, vomitting or change in bowel habits.  No urinary symptoms.      EXAM:                                                    There were no vitals taken for this visit.  GENERAL APPEARANCE: healthy, alert and no distress  HENT: ear canals and TM's normal and nose and mouth without ulcers or lesions  RESP: lungs clear to auscultation - no rales, rhonchi or wheezes  CV: regular rate and rhythm  ABDOMEN: soft, nontender, no HSM or masses and bowel sounds normal  MS: no ankle edema  NEURO: Normal strength and tone, sensory exam grossly normal, mentation intact and speech normal  PSYCH: mentation appears normal and affect normal/bright    DIAGNOSTICS:                                                      Labs Resulted Today:   Results for orders placed or performed in visit on 11/06/17   CBC with platelets   Result Value Ref Range    WBC 5.5 4.0 - 11.0  10e9/L    RBC Count 4.21 3.8 - 5.2 10e12/L    Hemoglobin 11.5 (L) 11.7 - 15.7 g/dL    Hematocrit 36.4 35.0 - 47.0 %    MCV 87 78 - 100 fl    MCH 27.3 26.5 - 33.0 pg    MCHC 31.6 31.5 - 36.5 g/dL    RDW 16.5 (H) 10.0 - 15.0 %    Platelet Count 182 150 - 450 10e9/L       Recent Labs   Lab Test  04/01/16   1654  11/26/13   0944  03/25/11   1014   HGB  12.1  12.2   --    PLT  154  194   --    NA   --    --   142   POTASSIUM   --    --   4.1   CR   --    --   0.72        IMPRESSION:                                                    Reason for surgery/procedure: epigastric hernia.     The proposed surgical procedure is considered INTERMEDIATE risk.    REVISED CARDIAC RISK INDEX  The patient has the following serious cardiovascular risks for perioperative complications such as (MI, PE, VFib and 3  AV Block):  No serious cardiac risks  INTERPRETATION: 0 risks: Class I (very low risk - 0.4% complication rate)    The patient has the following additional risks for perioperative complications:  No identified additional risks       Preop general physical exam    - CBC with platelets    Epigastric hernia  Anticipating surgery.    Need for hepatitis C screening test    - **Hepatitis C Screen Reflex to RNA FUTURE anytime      RECOMMENDATIONS:                                                          --Patient is not to take any scheduled medications on the day of surgery     Anticoagulant or Antiplatelet Medication Use  NSAIDS: Ibuprofen (Motrin):         Stop one day prior to surgery  Hold fish oil.           APPROVAL GIVEN to proceed with proposed procedure, without further diagnostic evaluation       Signed Electronically by: Nessa Contreras MD, MD    Copy of this evaluation report is provided to requesting physician.    [facility] Preop Guidelines

## 2017-11-13 ENCOUNTER — APPOINTMENT (OUTPATIENT)
Dept: SURGERY | Facility: PHYSICIAN GROUP | Age: 52
End: 2017-11-13
Payer: COMMERCIAL

## 2017-11-13 ENCOUNTER — ANESTHESIA (OUTPATIENT)
Dept: SURGERY | Facility: CLINIC | Age: 52
End: 2017-11-13
Payer: COMMERCIAL

## 2017-11-13 ENCOUNTER — HOSPITAL ENCOUNTER (OUTPATIENT)
Facility: CLINIC | Age: 52
Discharge: HOME | End: 2017-11-13
Attending: SURGERY | Admitting: SURGERY
Payer: COMMERCIAL

## 2017-11-13 ENCOUNTER — ANESTHESIA EVENT (OUTPATIENT)
Dept: SURGERY | Facility: CLINIC | Age: 52
End: 2017-11-13
Payer: COMMERCIAL

## 2017-11-13 ENCOUNTER — TELEPHONE (OUTPATIENT)
Dept: SURGERY | Facility: CLINIC | Age: 52
End: 2017-11-13

## 2017-11-13 VITALS
BODY MASS INDEX: 21.22 KG/M2 | SYSTOLIC BLOOD PRESSURE: 106 MMHG | WEIGHT: 140 LBS | DIASTOLIC BLOOD PRESSURE: 68 MMHG | HEIGHT: 68 IN | TEMPERATURE: 98.2 F | OXYGEN SATURATION: 100 % | RESPIRATION RATE: 16 BRPM

## 2017-11-13 DIAGNOSIS — K43.9 EPIGASTRIC HERNIA: Primary | ICD-10-CM

## 2017-11-13 LAB — HCG UR QL: NEGATIVE

## 2017-11-13 PROCEDURE — 37000009 ZZH ANESTHESIA TECHNICAL FEE, EACH ADDTL 15 MIN: Performed by: SURGERY

## 2017-11-13 PROCEDURE — 25000125 ZZHC RX 250: Performed by: NURSE ANESTHETIST, CERTIFIED REGISTERED

## 2017-11-13 PROCEDURE — 25000128 H RX IP 250 OP 636: Performed by: SURGERY

## 2017-11-13 PROCEDURE — 25000566 ZZH SEVOFLURANE, EA 15 MIN: Performed by: SURGERY

## 2017-11-13 PROCEDURE — 71000012 ZZH RECOVERY PHASE 1 LEVEL 1 FIRST HR: Performed by: SURGERY

## 2017-11-13 PROCEDURE — 37000008 ZZH ANESTHESIA TECHNICAL FEE, 1ST 30 MIN: Performed by: SURGERY

## 2017-11-13 PROCEDURE — 25000125 ZZHC RX 250: Performed by: SURGERY

## 2017-11-13 PROCEDURE — S0020 INJECTION, BUPIVICAINE HYDRO: HCPCS | Performed by: SURGERY

## 2017-11-13 PROCEDURE — 25000128 H RX IP 250 OP 636: Performed by: ANESTHESIOLOGY

## 2017-11-13 PROCEDURE — 25000128 H RX IP 250 OP 636: Performed by: NURSE ANESTHETIST, CERTIFIED REGISTERED

## 2017-11-13 PROCEDURE — 71000027 ZZH RECOVERY PHASE 2 EACH 15 MINS: Performed by: SURGERY

## 2017-11-13 PROCEDURE — 71000013 ZZH RECOVERY PHASE 1 LEVEL 1 EA ADDTL HR: Performed by: SURGERY

## 2017-11-13 PROCEDURE — 49587 ZZHC REPAIR UMBILICAL HERN,5+Y/O, INCARCERATED OR STRANGULATED: CPT | Mod: AS | Performed by: PHYSICIAN ASSISTANT

## 2017-11-13 PROCEDURE — 40000306 ZZH STATISTIC PRE PROC ASSESS II: Performed by: SURGERY

## 2017-11-13 PROCEDURE — C1781 MESH (IMPLANTABLE): HCPCS | Performed by: SURGERY

## 2017-11-13 PROCEDURE — 36000093 ZZH SURGERY LEVEL 4 1ST 30 MIN: Performed by: SURGERY

## 2017-11-13 PROCEDURE — 36000063 ZZH SURGERY LEVEL 4 EA 15 ADDTL MIN: Performed by: SURGERY

## 2017-11-13 PROCEDURE — 81025 URINE PREGNANCY TEST: CPT | Performed by: ANESTHESIOLOGY

## 2017-11-13 PROCEDURE — 49587 ZZHC REPAIR UMBILICAL HERN,5+Y/O, INCARCERATED OR STRANGULATED: CPT | Performed by: SURGERY

## 2017-11-13 PROCEDURE — 25000132 ZZH RX MED GY IP 250 OP 250 PS 637: Performed by: SURGERY

## 2017-11-13 PROCEDURE — 27210794 ZZH OR GENERAL SUPPLY STERILE: Performed by: SURGERY

## 2017-11-13 DEVICE — MESH VENTRALEX HERNIA 2.5" CIRCLE MED W/STRAP 5950008: Type: IMPLANTABLE DEVICE | Site: UMBILICAL | Status: FUNCTIONAL

## 2017-11-13 RX ORDER — MEPERIDINE HYDROCHLORIDE 25 MG/ML
12.5 INJECTION INTRAMUSCULAR; INTRAVENOUS; SUBCUTANEOUS
Status: DISCONTINUED | OUTPATIENT
Start: 2017-11-13 | End: 2017-11-13 | Stop reason: HOSPADM

## 2017-11-13 RX ORDER — DEXAMETHASONE SODIUM PHOSPHATE 4 MG/ML
4 INJECTION, SOLUTION INTRA-ARTICULAR; INTRALESIONAL; INTRAMUSCULAR; INTRAVENOUS; SOFT TISSUE EVERY 10 MIN PRN
Status: DISCONTINUED | OUTPATIENT
Start: 2017-11-13 | End: 2017-11-13 | Stop reason: HOSPADM

## 2017-11-13 RX ORDER — ONDANSETRON 2 MG/ML
4 INJECTION INTRAMUSCULAR; INTRAVENOUS EVERY 30 MIN PRN
Status: DISCONTINUED | OUTPATIENT
Start: 2017-11-13 | End: 2017-11-13 | Stop reason: HOSPADM

## 2017-11-13 RX ORDER — LIDOCAINE 40 MG/G
CREAM TOPICAL
Status: DISCONTINUED | OUTPATIENT
Start: 2017-11-13 | End: 2017-11-13 | Stop reason: HOSPADM

## 2017-11-13 RX ORDER — METOCLOPRAMIDE HYDROCHLORIDE 5 MG/ML
10 INJECTION INTRAMUSCULAR; INTRAVENOUS EVERY 6 HOURS PRN
Status: DISCONTINUED | OUTPATIENT
Start: 2017-11-13 | End: 2017-11-13 | Stop reason: HOSPADM

## 2017-11-13 RX ORDER — SENNOSIDES 8.6 MG
1-2 TABLET ORAL 2 TIMES DAILY PRN
Qty: 60 TABLET | Refills: 1 | COMMUNITY
Start: 2017-11-13 | End: 2019-03-06

## 2017-11-13 RX ORDER — SODIUM CHLORIDE, SODIUM LACTATE, POTASSIUM CHLORIDE, CALCIUM CHLORIDE 600; 310; 30; 20 MG/100ML; MG/100ML; MG/100ML; MG/100ML
INJECTION, SOLUTION INTRAVENOUS CONTINUOUS
Status: DISCONTINUED | OUTPATIENT
Start: 2017-11-13 | End: 2017-11-13 | Stop reason: HOSPADM

## 2017-11-13 RX ORDER — ONDANSETRON 4 MG/1
4 TABLET, ORALLY DISINTEGRATING ORAL EVERY 30 MIN PRN
Status: DISCONTINUED | OUTPATIENT
Start: 2017-11-13 | End: 2017-11-13 | Stop reason: HOSPADM

## 2017-11-13 RX ORDER — HYDROMORPHONE HYDROCHLORIDE 1 MG/ML
.3-.5 INJECTION, SOLUTION INTRAMUSCULAR; INTRAVENOUS; SUBCUTANEOUS EVERY 10 MIN PRN
Status: DISCONTINUED | OUTPATIENT
Start: 2017-11-13 | End: 2017-11-13 | Stop reason: HOSPADM

## 2017-11-13 RX ORDER — PROMETHAZINE HYDROCHLORIDE 25 MG/ML
12.5 INJECTION, SOLUTION INTRAMUSCULAR; INTRAVENOUS
Status: DISCONTINUED | OUTPATIENT
Start: 2017-11-13 | End: 2017-11-13 | Stop reason: HOSPADM

## 2017-11-13 RX ORDER — FENTANYL CITRATE 50 UG/ML
25-50 INJECTION, SOLUTION INTRAMUSCULAR; INTRAVENOUS
Status: DISCONTINUED | OUTPATIENT
Start: 2017-11-13 | End: 2017-11-13 | Stop reason: HOSPADM

## 2017-11-13 RX ORDER — DIMENHYDRINATE 50 MG/ML
25 INJECTION, SOLUTION INTRAMUSCULAR; INTRAVENOUS
Status: DISCONTINUED | OUTPATIENT
Start: 2017-11-13 | End: 2017-11-13 | Stop reason: HOSPADM

## 2017-11-13 RX ORDER — FENTANYL CITRATE 50 UG/ML
25-50 INJECTION, SOLUTION INTRAMUSCULAR; INTRAVENOUS
Status: CANCELLED | OUTPATIENT
Start: 2017-11-13

## 2017-11-13 RX ORDER — IBUPROFEN 600 MG/1
600 TABLET, FILM COATED ORAL EVERY 6 HOURS PRN
Qty: 30 TABLET | Refills: 0 | COMMUNITY
Start: 2017-11-13

## 2017-11-13 RX ORDER — NALOXONE HYDROCHLORIDE 0.4 MG/ML
.1-.4 INJECTION, SOLUTION INTRAMUSCULAR; INTRAVENOUS; SUBCUTANEOUS
Status: DISCONTINUED | OUTPATIENT
Start: 2017-11-13 | End: 2017-11-13 | Stop reason: HOSPADM

## 2017-11-13 RX ORDER — BUPIVACAINE HYDROCHLORIDE 2.5 MG/ML
INJECTION, SOLUTION EPIDURAL; INFILTRATION; INTRACAUDAL PRN
Status: DISCONTINUED | OUTPATIENT
Start: 2017-11-13 | End: 2017-11-13 | Stop reason: HOSPADM

## 2017-11-13 RX ORDER — METOCLOPRAMIDE 10 MG/1
10 TABLET ORAL EVERY 6 HOURS PRN
Status: DISCONTINUED | OUTPATIENT
Start: 2017-11-13 | End: 2017-11-13 | Stop reason: HOSPADM

## 2017-11-13 RX ORDER — ONDANSETRON 2 MG/ML
INJECTION INTRAMUSCULAR; INTRAVENOUS PRN
Status: DISCONTINUED | OUTPATIENT
Start: 2017-11-13 | End: 2017-11-13

## 2017-11-13 RX ORDER — CEFAZOLIN SODIUM 1 G/3ML
1 INJECTION, POWDER, FOR SOLUTION INTRAMUSCULAR; INTRAVENOUS SEE ADMIN INSTRUCTIONS
Status: DISCONTINUED | OUTPATIENT
Start: 2017-11-13 | End: 2017-11-13 | Stop reason: HOSPADM

## 2017-11-13 RX ORDER — HYDROCODONE BITARTRATE AND ACETAMINOPHEN 5; 325 MG/1; MG/1
1-2 TABLET ORAL EVERY 4 HOURS PRN
Qty: 20 TABLET | Refills: 0 | Status: SHIPPED | OUTPATIENT
Start: 2017-11-13 | End: 2019-03-06

## 2017-11-13 RX ORDER — FENTANYL CITRATE 50 UG/ML
INJECTION, SOLUTION INTRAMUSCULAR; INTRAVENOUS PRN
Status: DISCONTINUED | OUTPATIENT
Start: 2017-11-13 | End: 2017-11-13

## 2017-11-13 RX ORDER — PROPOFOL 10 MG/ML
INJECTION, EMULSION INTRAVENOUS PRN
Status: DISCONTINUED | OUTPATIENT
Start: 2017-11-13 | End: 2017-11-13

## 2017-11-13 RX ORDER — CEFAZOLIN SODIUM 2 G/100ML
2 INJECTION, SOLUTION INTRAVENOUS
Status: COMPLETED | OUTPATIENT
Start: 2017-11-13 | End: 2017-11-13

## 2017-11-13 RX ORDER — HYDROCODONE BITARTRATE AND ACETAMINOPHEN 5; 325 MG/1; MG/1
1-2 TABLET ORAL
Status: COMPLETED | OUTPATIENT
Start: 2017-11-13 | End: 2017-11-13

## 2017-11-13 RX ORDER — HYDRALAZINE HYDROCHLORIDE 20 MG/ML
2.5-5 INJECTION INTRAMUSCULAR; INTRAVENOUS EVERY 10 MIN PRN
Status: DISCONTINUED | OUTPATIENT
Start: 2017-11-13 | End: 2017-11-13 | Stop reason: HOSPADM

## 2017-11-13 RX ORDER — DEXAMETHASONE SODIUM PHOSPHATE 4 MG/ML
INJECTION, SOLUTION INTRA-ARTICULAR; INTRALESIONAL; INTRAMUSCULAR; INTRAVENOUS; SOFT TISSUE PRN
Status: DISCONTINUED | OUTPATIENT
Start: 2017-11-13 | End: 2017-11-13

## 2017-11-13 RX ORDER — GLYCOPYRROLATE 0.2 MG/ML
INJECTION, SOLUTION INTRAMUSCULAR; INTRAVENOUS PRN
Status: DISCONTINUED | OUTPATIENT
Start: 2017-11-13 | End: 2017-11-13

## 2017-11-13 RX ORDER — IBUPROFEN 600 MG/1
600 TABLET, FILM COATED ORAL
Status: COMPLETED | OUTPATIENT
Start: 2017-11-13 | End: 2017-11-13

## 2017-11-13 RX ORDER — DROPERIDOL 2.5 MG/ML
0.62 INJECTION, SOLUTION INTRAMUSCULAR; INTRAVENOUS
Status: DISCONTINUED | OUTPATIENT
Start: 2017-11-13 | End: 2017-11-13

## 2017-11-13 RX ADMIN — GLYCOPYRROLATE 0.2 MG: 0.2 INJECTION, SOLUTION INTRAMUSCULAR; INTRAVENOUS at 07:34

## 2017-11-13 RX ADMIN — ONDANSETRON 4 MG: 2 INJECTION INTRAMUSCULAR; INTRAVENOUS at 10:35

## 2017-11-13 RX ADMIN — IBUPROFEN 600 MG: 600 TABLET ORAL at 09:19

## 2017-11-13 RX ADMIN — PROPOFOL 200 MG: 10 INJECTION, EMULSION INTRAVENOUS at 07:34

## 2017-11-13 RX ADMIN — CEFAZOLIN SODIUM 2 G: 2 INJECTION, SOLUTION INTRAVENOUS at 07:32

## 2017-11-13 RX ADMIN — FENTANYL CITRATE 70 MCG: 50 INJECTION, SOLUTION INTRAMUSCULAR; INTRAVENOUS at 07:45

## 2017-11-13 RX ADMIN — SODIUM CHLORIDE, POTASSIUM CHLORIDE, SODIUM LACTATE AND CALCIUM CHLORIDE: 600; 310; 30; 20 INJECTION, SOLUTION INTRAVENOUS at 07:32

## 2017-11-13 RX ADMIN — HYDROCODONE BITARTRATE AND ACETAMINOPHEN 1 TABLET: 5; 325 TABLET ORAL at 09:19

## 2017-11-13 RX ADMIN — DEXAMETHASONE SODIUM PHOSPHATE 4 MG: 4 INJECTION, SOLUTION INTRA-ARTICULAR; INTRALESIONAL; INTRAMUSCULAR; INTRAVENOUS; SOFT TISSUE at 07:34

## 2017-11-13 RX ADMIN — ONDANSETRON 4 MG: 2 INJECTION INTRAMUSCULAR; INTRAVENOUS at 08:11

## 2017-11-13 RX ADMIN — FENTANYL CITRATE 30 MCG: 50 INJECTION, SOLUTION INTRAMUSCULAR; INTRAVENOUS at 07:34

## 2017-11-13 RX ADMIN — HYDROMORPHONE HYDROCHLORIDE 0.5 MG: 1 INJECTION, SOLUTION INTRAMUSCULAR; INTRAVENOUS; SUBCUTANEOUS at 09:26

## 2017-11-13 ASSESSMENT — ENCOUNTER SYMPTOMS: DYSRHYTHMIAS: 0

## 2017-11-13 NOTE — ANESTHESIA PREPROCEDURE EVALUATION
Anesthesia Evaluation     .             ROS/MED HX    ENT/Pulmonary:      (-) asthma, sleep apnea and Other pulmonary disease   Neurologic:      (-) TIA, Other neuro hx and Dementia   Cardiovascular:        (-) hypertension, CAD, CHF, arrhythmias, pulmonary hypertension and dyslipidemia   METS/Exercise Tolerance:     Hematologic:        (-) anemia   Musculoskeletal:   (+) arthritis, , , -       GI/Hepatic:        (-) GERD, hiatal hernia and hepatitis   Renal/Genitourinary:      (-) renal disease   Endo:      (-) Type I DM, Type II DM, thyroid disease, chronic steroid usage, other endocrine disorder and obesity   Psychiatric:        (-) psychiatric history   Infectious Disease:  - neg infectious disease ROS       Malignancy:      - no malignancy   Other:    - neg other ROS                 Physical Exam      Airway   Mallampati: II  TM distance: >3 FB  Neck ROM: full    Dental     Cardiovascular   Rhythm and rate: regular and normal  (-) no murmur    Pulmonary    breath sounds clear to auscultation    Other findings: Lab Test        11/06/17 04/01/16 11/26/13                       1622          1654          0944          WBC          5.5          5.9          6.5           HGB          11.5*        12.1         12.2          MCV          87           89           91            PLT          182          154          194            Lab Test        12/24/14 03/25/11                       0951          1014          NA            --          142           POTASSIUM     --          4.1           CHLORIDE      --          103           CO2           --          28            BUN           --          13            CR            --          0.72          ANIONGAP      --          11            ALBER           --          9.3           GLC          83           84                          Anesthesia Plan      History & Physical Review  History and physical reviewed and following examination; no interval  change.    ASA Status:  1 .        Plan for General and LMA with Propofol induction. Maintenance will be Balanced.    PONV prophylaxis:  Ondansetron (or other 5HT-3) and Dexamethasone or Solumedrol       Postoperative Care  Postoperative pain management:  IV analgesics and Oral pain medications.      Consents  Anesthetic plan, risks, benefits and alternatives discussed with:  Patient..                          .

## 2017-11-13 NOTE — IP AVS SNAPSHOT
[facility] PreOP/PostOP    [address]    [facility] [address]    Phone:  [phone]    Fax:  [phone]                                       After Visit Summary   11/13/2017    Graeme Johnson    MRN: [id number]           After Visit Summary Signature Page     I have received my discharge instructions, and my questions have been answered. I have discussed any challenges I see with this plan with the nurse or doctor.    ..........................................................................................................................................  Patient/Patient Representative Signature      ..........................................................................................................................................  Patient Representative Print Name and Relationship to Patient    ..................................................               ................................................  Date                                            Time    ..........................................................................................................................................  Reviewed by Signature/Title    ...................................................              ..............................................  Date                                                            Time

## 2017-11-13 NOTE — ANESTHESIA CARE TRANSFER NOTE
Patient: Graeme Johnson    Procedure(s):  open repair epigastric and umbilical hernia with mesh     - Wound Class: I-Clean    Diagnosis: epigastric hernia  Diagnosis Additional Information: No value filed.    Anesthesia Type:   General, LMA     Note:  Airway :LMA  Patient transferred to:PACU  Comments: Did well  Handoff Report: Identifed the Patient, Identified the Reponsible Provider, Reviewed the pertinent medical history, Discussed the surgical course, Reviewed Intra-OP anesthesia mangement and issues during anesthesia, Set expectations for post-procedure period and Allowed opportunity for questions and acknowledgement of understanding      Vitals: (Last set prior to Anesthesia Care Transfer)    CRNA VITALS  11/13/2017 0756 - 11/13/2017 0831      11/13/2017             Pulse: 64    SpO2: 100 %    Resp Rate (observed): 8                Electronically Signed By: DEYA Meneses CRNA  November 13, 2017  8:31 AM

## 2017-11-13 NOTE — DISCHARGE INSTRUCTIONS
"HOME CARE FOLLOWING UMBILICAL/VENTRAL HERNIA REPAIR  CIERA Weston E. Gavin, CIERA Hutson, ELI Penny    DIET:  No restrictions.  Increased fluid intake is recommended. While taking pain medications, increase dietary fiber or add a fiber supplementation like Metamucil or Citrucel to help prevent constipation - a possible side effect of pain medications.    NAUSEA:  If nauseated from the anesthetic/pain meds; rest in bed, get up cautiously with assistance, and drink clear liquids (juice, tea, broth).    ACTIVITY:  Light Activity -- you may immediately be up and about as tolerated.  Driving -- you may drive when comfortable and off narcotic pain medications.  Light Work -- resume when comfortable off pain medications.  (If you can drive, you probably can work.)  Strenuous Work/Activity -- limit lifting to 20 pounds for 3 weeks.  Active Sports (running, biking, etc.) -- cautiously resume after 4 weeks.    INCISIONAL CARE:    If you have a dressing in place, keep clean and dry for 48 hours after surgery.  After this timeframe, you may replace the gauze daily if it becomes soiled.    You may remove the dressing and shower 48 hours after surgery.  Do not submerse incision in water for 1 week.    If you have a Dermabond dressing (a type of skin glue), you may shower immediately.    Sutures will absorb and need not be removed.    If present, leave the steri-strips (white paper tapes) in place for 14 days after surgery.    If present, leave Dermabond glue in place until it wears/flakes off.    Expect a variable amount of swelling/bruising/discoloration that may appear around or below the repair site.    Some numbness around the incision is common.    A lump/\"healing ridge\" under the incision is normal and will gradually resolve over the following 1-2 months.    DISCOMFORT:  Local anesthetic placed at surgery should provide relief for 4-8 hours.  Begin taking pain pills before " discomfort is severe.  Take the pain medication with some food, when possible, to minimize side effects.  Intermittent use of ice packs to the hernia repair site may help during the first 1-3 weeks after surgery.  Expect gradual improvement.    Over-the-counter anti-inflammatory medications (i.e. Ibuprofen/Advil/Motrin or Naprosyn/Aleve) may be used per package instructions in addition to or while tapering off the narcotic pain medications to decrease swelling and sensitivity at the repair site.  DO NOT TAKE these Anti-inflammatory medications if your primary physician has advised against doing so, or if you have acid reflux, ulcer, or bleeding disorder, or take blood-thinner medications.  Call your primary physician or the surgery office if you have medication questions.      RETURN APPOINTMENT:  Schedule a follow-up visit 2-3 weeks post-op.  Office Phone:  [phone]     CONTACT US IF THE FOLLOWING DEVELOPS:   1. A fever that is above 101     2. If there is a large amount of drainage, bleeding, or swelling.   3. Severe pain that is not relieved by your prescription.   4. Drainage that is thick, cloudy, yellow, green or white.   5. Any other questions not answered by  Frequently Asked Questions  sheet.      FREQUENTLY ASKED QUESTIONS:    Q:  How should my incision look?    A:  Normally your incision will appear slightly swollen with light redness directly along the incision itself as it heals.  It may feel like a bump or ridge as the healing/scarring happens, and over time (3-4 months) this bump or ridge feeling should slowly go away.  In general, clear or pink watery drainage can be normal at first as your incision heals, but should decrease over time.    Q:  How do I know if my incision is infected?  A:  Look at your incision for signs of infection, like redness around the incision spreading to surrounding skin, or drainage of cloudy or foul-smelling drainage.  If you feel warm, check your temperature to see if  you are running a fever.    **If any of these things occur, please notify the nurse at our office.  We may need you to come into the office for an incision check.      Q:  How do I take care of my incision?  A:  If you have a dressing in place - Starting the day after surgery, replace the dressing 1-2 times a day until there is no further drainage from the incision.  At that time, a dressing is no longer needed.  Try to minimize tape on the skin if irritation is occurring at the tape sites.  If you have significant irritation from tape on the skin, please call the office to discuss other method of dressing your incision.    Small pieces of tape called  steri-strips  may be present directly overlying your incision; these may be removed 10 days after surgery unless otherwise specified by your surgeon.  If these tapes start to loosen at the ends, you may trim them back until they fall off or are removed.    A:  If you had  Dermabond  tissue glue used as a dressing (this causes your incision to look shiny with a clear covering over it) - This type of dressing wears off with time and does not require more dressings over the top unless it is draining around the glue as it wears off.  Do not apply ointments or lotions over the incisions until the glue has completely worn off.    Q:  There is a piece of tape or a sticky  lead  still on my skin.  Can I remove this?  A:  Sometimes the sticky  leads  used for monitoring during surgery or for evaluation in the emergency department are not all removed while you are in the hospital.  These sometimes have a tab or metal dot on them.  You can easily remove these on your own, like taking off a band-aid.  If there is a gel substance under the  lead , simply wipe/clean it off with a washcloth or paper towel.      Q:  What can I do to minimize constipation (very hard stools, or lack of stools)?  A:  Stay well hydrated.  Increase your dietary fiber intake or take a fiber supplement  -with plenty of water.  Walk around frequently.  You may consider an over-the-counter stool-softener.  Your Pharmacist can assist you with choosing one that is stocked at your pharmacy.  Constipation is also one of the most common side effects of pain medication.  If you are using pain medication, be pro-active and try to PREVENT problems with constipation by taking the steps above BEFORE constipation becomes a problem.    Q:  What do I do if I need more pain medications?  A:  Call the office to receive refills.  Be aware that certain pain meds cannot be called into a pharmacy and actually require a paper prescription.  A change may be made in your pain med as you progress thru your recovery period or if you have side effects to certain meds.    --Pain meds are NOT refilled after 5pm on weekdays, and NOT AT ALL on the weekends, so please look ahead to prevent problems.      Q:  Why am I having a hard time sleeping now that I am at home?  A:  Many medications you receive while you are in the hospital can impact your sleep for a number of days after your surgery/hospitalization.  Decreased level of activity and naps during the day may also make sleeping at night difficult.  Try to minimize day-time naps, and get up frequently during the day to walk around your home during your recovery time.  Sleep aides may be of some help, but are not recommended for long-term use.      Q:  I am having some back discomfort.  What should I do?  A:  This may be related to certain positioning that was required for your surgery, extended periods of time in bed, or other changes in your overall activity level.  You may try ice, heat, acetaminophen, or ibuprofen to treat this temporarily.  Note that many pain medications have acetaminophen in them and would state this on the prescription bottle.  Be sure not to exceed the maximum of 4000mg per day of acetaminophen.     **If the pain you are having does not resolve, is severe, or is a  flare of back pain you have had on other occasions prior to surgery, please contact your primary physician for further recommendations or for an appointment to be examined at their office.    Q:  Why am I having headaches?  A:  Headaches can be caused by many things:  caffeine withdrawal, use of pain meds, dehydration, high blood pressure, lack of sleep, over-activity/exhaustion, flare-up of usual migraine headaches.  If you feel this is related to muscle tension (a band-like feeling around the head, or a pressure at the low-back of the head) you may try ice or heat to this area.  You may need to drink more fluids (try electrolyte drink like Gatorade), rest, or take your usual migraine medications.   **If your headaches do not resolve, worsen, are accompanied by other symptoms, or if your blood pressure is high, please call your primary physician for recommendation and/or examination.    Q:  I am unable to urinate.  What do I do?  A:  A small percentage of people can have difficulty urinating initially after surgery.  This includes being able to urinate only a very small amount at a time and feeling discomfort or pressure in the very low abdomen.  This is called  urinary retention , and is actually an urgent situation.  Proceed to your nearest Emergency department for evaluation (not an Urgent Care Center).  Sometimes the bladder does not work correctly after certain medications you receive during surgery, or related to certain procedures.  You may need to have a catheter placed until your bladder recovers.  When planning to go to an Emergency department, it may help to call the ER to let them know you are coming in for this problem after a surgery.  This may help you get in quicker to be evaluated.  **If you have symptoms of a urinary tract infection, please contact your primary physician for the proper evaluation and treatment.          If you have other questions, please call the office Monday thru Friday between  8am and 5pm to discuss with the nurse or physician assistant.  #[phone]    There is a surgeon ON CALL on weekday evenings and over the weekend in case of urgent need only, and may be contacted at the same number.    If you are having an emergency, call 911 or proceed to your nearest emergency department.      GENERAL ANESTHESIA OR SEDATION ADULT DISCHARGE INSTRUCTIONS   SPECIAL PRECAUTIONS FOR 24 HOURS AFTER SURGERY    IT IS NOT UNUSUAL TO FEEL LIGHT-HEADED OR FAINT, UP TO 24 HOURS AFTER SURGERY OR WHILE TAKING PAIN MEDICATION.  IF YOU HAVE THESE SYMPTOMS; SIT FOR A FEW MINUTES BEFORE STANDING AND HAVE SOMEONE ASSIST YOU WHEN YOU GET UP TO WALK OR USE THE BATHROOM.    YOU SHOULD REST AND RELAX FOR THE NEXT 24 HOURS AND YOU MUST MAKE ARRANGEMENTS TO HAVE SOMEONE STAY WITH YOU FOR AT LEAST 24 HOURS AFTER YOUR DISCHARGE.  AVOID HAZARDOUS AND STRENUOUS ACTIVITIES.  DO NOT MAKE IMPORTANT DECISIONS FOR 24 HOURS.    DO NOT DRIVE ANY VEHICLE OR OPERATE MECHANICAL EQUIPMENT FOR 24 HOURS FOLLOWING THE END OF YOUR SURGERY.  EVEN THOUGH YOU MAY FEEL NORMAL, YOUR REACTIONS MAY BE AFFECTED BY THE MEDICATION YOU HAVE RECEIVED.    DO NOT DRINK ALCOHOLIC BEVERAGES FOR 24 HOURS FOLLOWING YOUR SURGERY.    DRINK CLEAR LIQUIDS (APPLE JUICE, GINGER ALE, 7-UP, BROTH, ETC.).  PROGRESS TO YOUR REGULAR DIET AS YOU FEEL ABLE.    YOU MAY HAVE A DRY MOUTH, A SORE THROAT, MUSCLES ACHES OR TROUBLE SLEEPING.  THESE SHOULD GO AWAY AFTER 24 HOURS.    CALL YOUR DOCTOR FOR ANY OF THE FOLLOWING:  SIGNS OF INFECTION (FEVER, GROWING TENDERNESS AT THE SURGERY SITE, A LARGE AMOUNT OF DRAINAGE OR BLEEDING, SEVERE PAIN, FOUL-SMELLING DRAINAGE, REDNESS OR SWELLING.    IT HAS BEEN OVER 8 TO 10 HOURS SINCE SURGERY AND YOU ARE STILL NOT ABLE TO URINATE (PASS WATER).

## 2017-11-13 NOTE — IP AVS SNAPSHOT
MRN:[id number]                      After Visit Summary   11/13/2017    Graeme Johnson    MRN: [id number]           Thank you!     Thank you for choosing [facility] for your care. Our goal is always to provide you with excellent care. Hearing back from our patients is one way we can continue to improve our services. Please take a few minutes to complete the written survey that you may receive in the mail after you visit. If you would like to speak to someone directly about your visit please contact Patient Relations at [phone]. Thank you!          Patient Information     Date Of Birth          1965        About your hospital stay     You were admitted on:  November 13, 2017 You last received care in the:  [facility] PreOP/PostOP    You were discharged on:  November 13, 2017       Who to Call     For medical emergencies, please call 911.  For non-urgent questions about your medical care, please call your primary care provider or clinic, [phone]  For questions related to your surgery, please call your surgery clinic        Attending Provider     Provider Luanne Downey MD Surgery       Primary Care Provider Office Phone # Fax #    Nessa Contreras [phone]      Further instructions from your care team       HOME CARE FOLLOWING UMBILICAL/VENTRAL HERNIA REPAIR  CIERA Weston, FELICITA Damian, CIERA Hutson, ELI Penny    DIET:  No restrictions.  Increased fluid intake is recommended. While taking pain medications, increase dietary fiber or add a fiber supplementation like Metamucil or Citrucel to help prevent constipation - a possible side effect of pain medications.    NAUSEA:  If nauseated from the anesthetic/pain meds; rest in bed, get up cautiously with assistance, and drink clear liquids (juice, tea, broth).    ACTIVITY:  Light Activity -- you may immediately be up and about as  "tolerated.  Driving -- you may drive when comfortable and off narcotic pain medications.  Light Work -- resume when comfortable off pain medications.  (If you can drive, you probably can work.)  Strenuous Work/Activity -- limit lifting to 20 pounds for 3 weeks.  Active Sports (running, biking, etc.) -- cautiously resume after 4 weeks.    INCISIONAL CARE:    If you have a dressing in place, keep clean and dry for 48 hours after surgery.  After this timeframe, you may replace the gauze daily if it becomes soiled.    You may remove the dressing and shower 48 hours after surgery.  Do not submerse incision in water for 1 week.    If you have a Dermabond dressing (a type of skin glue), you may shower immediately.    Sutures will absorb and need not be removed.    If present, leave the steri-strips (white paper tapes) in place for 14 days after surgery.    If present, leave Dermabond glue in place until it wears/flakes off.    Expect a variable amount of swelling/bruising/discoloration that may appear around or below the repair site.    Some numbness around the incision is common.    A lump/\"healing ridge\" under the incision is normal and will gradually resolve over the following 1-2 months.    DISCOMFORT:  Local anesthetic placed at surgery should provide relief for 4-8 hours.  Begin taking pain pills before discomfort is severe.  Take the pain medication with some food, when possible, to minimize side effects.  Intermittent use of ice packs to the hernia repair site may help during the first 1-3 weeks after surgery.  Expect gradual improvement.    Over-the-counter anti-inflammatory medications (i.e. Ibuprofen/Advil/Motrin or Naprosyn/Aleve) may be used per package instructions in addition to or while tapering off the narcotic pain medications to decrease swelling and sensitivity at the repair site.  DO NOT TAKE these Anti-inflammatory medications if your primary physician has advised against doing so, or if you have acid " reflux, ulcer, or bleeding disorder, or take blood-thinner medications.  Call your primary physician or the surgery office if you have medication questions.      RETURN APPOINTMENT:  Schedule a follow-up visit 2-3 weeks post-op.  Office Phone:  [phone]     CONTACT US IF THE FOLLOWING DEVELOPS:   1. A fever that is above 101     2. If there is a large amount of drainage, bleeding, or swelling.   3. Severe pain that is not relieved by your prescription.   4. Drainage that is thick, cloudy, yellow, green or white.   5. Any other questions not answered by  Frequently Asked Questions  sheet.      FREQUENTLY ASKED QUESTIONS:    Q:  How should my incision look?    A:  Normally your incision will appear slightly swollen with light redness directly along the incision itself as it heals.  It may feel like a bump or ridge as the healing/scarring happens, and over time (3-4 months) this bump or ridge feeling should slowly go away.  In general, clear or pink watery drainage can be normal at first as your incision heals, but should decrease over time.    Q:  How do I know if my incision is infected?  A:  Look at your incision for signs of infection, like redness around the incision spreading to surrounding skin, or drainage of cloudy or foul-smelling drainage.  If you feel warm, check your temperature to see if you are running a fever.    **If any of these things occur, please notify the nurse at our office.  We may need you to come into the office for an incision check.      Q:  How do I take care of my incision?  A:  If you have a dressing in place - Starting the day after surgery, replace the dressing 1-2 times a day until there is no further drainage from the incision.  At that time, a dressing is no longer needed.  Try to minimize tape on the skin if irritation is occurring at the tape sites.  If you have significant irritation from tape on the skin, please call the office to discuss other method of dressing your  incision.    Small pieces of tape called  steri-strips  may be present directly overlying your incision; these may be removed 10 days after surgery unless otherwise specified by your surgeon.  If these tapes start to loosen at the ends, you may trim them back until they fall off or are removed.    A:  If you had  Dermabond  tissue glue used as a dressing (this causes your incision to look shiny with a clear covering over it) - This type of dressing wears off with time and does not require more dressings over the top unless it is draining around the glue as it wears off.  Do not apply ointments or lotions over the incisions until the glue has completely worn off.    Q:  There is a piece of tape or a sticky  lead  still on my skin.  Can I remove this?  A:  Sometimes the sticky  leads  used for monitoring during surgery or for evaluation in the emergency department are not all removed while you are in the hospital.  These sometimes have a tab or metal dot on them.  You can easily remove these on your own, like taking off a band-aid.  If there is a gel substance under the  lead , simply wipe/clean it off with a washcloth or paper towel.      Q:  What can I do to minimize constipation (very hard stools, or lack of stools)?  A:  Stay well hydrated.  Increase your dietary fiber intake or take a fiber supplement -with plenty of water.  Walk around frequently.  You may consider an over-the-counter stool-softener.  Your Pharmacist can assist you with choosing one that is stocked at your pharmacy.  Constipation is also one of the most common side effects of pain medication.  If you are using pain medication, be pro-active and try to PREVENT problems with constipation by taking the steps above BEFORE constipation becomes a problem.    Q:  What do I do if I need more pain medications?  A:  Call the office to receive refills.  Be aware that certain pain meds cannot be called into a pharmacy and actually require a paper  prescription.  A change may be made in your pain med as you progress thru your recovery period or if you have side effects to certain meds.    --Pain meds are NOT refilled after 5pm on weekdays, and NOT AT ALL on the weekends, so please look ahead to prevent problems.      Q:  Why am I having a hard time sleeping now that I am at home?  A:  Many medications you receive while you are in the hospital can impact your sleep for a number of days after your surgery/hospitalization.  Decreased level of activity and naps during the day may also make sleeping at night difficult.  Try to minimize day-time naps, and get up frequently during the day to walk around your home during your recovery time.  Sleep aides may be of some help, but are not recommended for long-term use.      Q:  I am having some back discomfort.  What should I do?  A:  This may be related to certain positioning that was required for your surgery, extended periods of time in bed, or other changes in your overall activity level.  You may try ice, heat, acetaminophen, or ibuprofen to treat this temporarily.  Note that many pain medications have acetaminophen in them and would state this on the prescription bottle.  Be sure not to exceed the maximum of 4000mg per day of acetaminophen.     **If the pain you are having does not resolve, is severe, or is a flare of back pain you have had on other occasions prior to surgery, please contact your primary physician for further recommendations or for an appointment to be examined at their office.    Q:  Why am I having headaches?  A:  Headaches can be caused by many things:  caffeine withdrawal, use of pain meds, dehydration, high blood pressure, lack of sleep, over-activity/exhaustion, flare-up of usual migraine headaches.  If you feel this is related to muscle tension (a band-like feeling around the head, or a pressure at the low-back of the head) you may try ice or heat to this area.  You may need to drink more  fluids (try electrolyte drink like Gatorade), rest, or take your usual migraine medications.   **If your headaches do not resolve, worsen, are accompanied by other symptoms, or if your blood pressure is high, please call your primary physician for recommendation and/or examination.    Q:  I am unable to urinate.  What do I do?  A:  A small percentage of people can have difficulty urinating initially after surgery.  This includes being able to urinate only a very small amount at a time and feeling discomfort or pressure in the very low abdomen.  This is called  urinary retention , and is actually an urgent situation.  Proceed to your nearest Emergency department for evaluation (not an Urgent Care Center).  Sometimes the bladder does not work correctly after certain medications you receive during surgery, or related to certain procedures.  You may need to have a catheter placed until your bladder recovers.  When planning to go to an Emergency department, it may help to call the ER to let them know you are coming in for this problem after a surgery.  This may help you get in quicker to be evaluated.  **If you have symptoms of a urinary tract infection, please contact your primary physician for the proper evaluation and treatment.          If you have other questions, please call the office Monday thru Friday between 8am and 5pm to discuss with the nurse or physician assistant.  #[phone]    There is a surgeon ON CALL on weekday evenings and over the weekend in case of urgent need only, and may be contacted at the same number.    If you are having an emergency, call 911 or proceed to your nearest emergency department.      GENERAL ANESTHESIA OR SEDATION ADULT DISCHARGE INSTRUCTIONS   SPECIAL PRECAUTIONS FOR 24 HOURS AFTER SURGERY    IT IS NOT UNUSUAL TO FEEL LIGHT-HEADED OR FAINT, UP TO 24 HOURS AFTER SURGERY OR WHILE TAKING PAIN MEDICATION.  IF YOU HAVE THESE SYMPTOMS; SIT FOR A FEW MINUTES BEFORE STANDING AND  "HAVE SOMEONE ASSIST YOU WHEN YOU GET UP TO WALK OR USE THE BATHROOM.    YOU SHOULD REST AND RELAX FOR THE NEXT 24 HOURS AND YOU MUST MAKE ARRANGEMENTS TO HAVE SOMEONE STAY WITH YOU FOR AT LEAST 24 HOURS AFTER YOUR DISCHARGE.  AVOID HAZARDOUS AND STRENUOUS ACTIVITIES.  DO NOT MAKE IMPORTANT DECISIONS FOR 24 HOURS.    DO NOT DRIVE ANY VEHICLE OR OPERATE MECHANICAL EQUIPMENT FOR 24 HOURS FOLLOWING THE END OF YOUR SURGERY.  EVEN THOUGH YOU MAY FEEL NORMAL, YOUR REACTIONS MAY BE AFFECTED BY THE MEDICATION YOU HAVE RECEIVED.    DO NOT DRINK ALCOHOLIC BEVERAGES FOR 24 HOURS FOLLOWING YOUR SURGERY.    DRINK CLEAR LIQUIDS (APPLE JUICE, GINGER ALE, 7-UP, BROTH, ETC.).  PROGRESS TO YOUR REGULAR DIET AS YOU FEEL ABLE.    YOU MAY HAVE A DRY MOUTH, A SORE THROAT, MUSCLES ACHES OR TROUBLE SLEEPING.  THESE SHOULD GO AWAY AFTER 24 HOURS.    CALL YOUR DOCTOR FOR ANY OF THE FOLLOWING:  SIGNS OF INFECTION (FEVER, GROWING TENDERNESS AT THE SURGERY SITE, A LARGE AMOUNT OF DRAINAGE OR BLEEDING, SEVERE PAIN, FOUL-SMELLING DRAINAGE, REDNESS OR SWELLING.    IT HAS BEEN OVER 8 TO 10 HOURS SINCE SURGERY AND YOU ARE STILL NOT ABLE TO URINATE (PASS WATER).       Pending Results     No orders found from 11/11/2017 to 11/14/2017.            Admission Information     Date & Time Provider Department Dept. Phone    11/13/2017 Luanne Damian MD [facility] PreOP/PostOP [phone]      Your Vitals Were     Blood Pressure Temperature Respirations Height Weight Last Period    106/68 98.2  F (36.8  C) (Temporal) 16 1.727 m (5' 7.99\") 63.5 kg (140 lb) 09/15/2017    Pulse Oximetry BMI (Body Mass Index)                98% 21.29 kg/m2          MyChart Information     [facility] gives you secure access to your electronic health record. If you see a primary care provider, you can also send messages to your care team and make appointments. If you have questions, please call your primary care clinic.  If you do not have a primary care provider, please " call [phone] and they will assist you.        Care EveryWhere ID     This is your Care EveryWhere ID. This could be used by other organizations to access your [facility] medical records  [id number]        Equal Access to Services     TIFFANY JEONG: Eden Lópezali, waaxda luqadaha, qaybta kaalmada adelos, aguila anahiin hayaadevin aranda xochitllazaro jeong. So [facility] [phone].    ATENCIÓN: Si habla español, tiene a romero disposición servicios gratuitos de asistencia lingüística. Llame al [phone].    We comply with applicable federal civil rights laws and [place] laws. We do not discriminate on the basis of race, color, national origin, age, disability, sex, sexual orientation, or gender identity.               Review of your medicines      START taking        Dose / Directions    HYDROcodone-acetaminophen 5-325 MG per tablet   Commonly known as:  NORCO   Used for:  Epigastric hernia        Dose:  1-2 tablet   Take 1-2 tablets by mouth every 4 hours as needed for other (Moderate to Severe Pain)   Quantity:  20 tablet   Refills:  0       ibuprofen 600 MG tablet   Commonly known as:  ADVIL/MOTRIN   Used for:  Epigastric hernia        Dose:  600 mg   Take 1 tablet (600 mg) by mouth every 6 hours as needed for pain (mild)   Quantity:  30 tablet   Refills:  0       sennosides 8.6 MG tablet   Commonly known as:  SENOKOT   Used for:  Epigastric hernia        Dose:  1-2 tablet   Take 1-2 tablets by mouth 2 times daily as needed for constipation   Quantity:  60 tablet   Refills:  1         CONTINUE these medicines which have NOT CHANGED        Dose / Directions    fluticasone 50 MCG/ACT spray   Commonly known as:  FLONASE   Used for:  Seasonal allergic rhinitis        USE TWO SPRAY(S) IN EACH NOSTRIL ONCE DAILY   Quantity:  1 Bottle   Refills:  11       Multi-vitamin Tabs tablet        Dose:  1 tablet   Take 1 tablet by mouth daily   Quantity:  100 tablet   Refills:  3       NEW MED        Calcium  supplement.  Unsure of dose   Refills:  0       omega 3 1000 MG Caps        Dose:  1 g   Take 1 g by mouth daily   Quantity:  90 capsule   Refills:  0       ZYRTEC ALLERGY PO        Take  by mouth. 1 tablet daily as needed   Refills:  0            Where to get your medicines      Some of these will need a paper prescription and others can be bought over the counter. Ask your nurse if you have questions.     Bring a paper prescription for each of these medications     HYDROcodone-acetaminophen 5-325 MG per tablet       You don't need a prescription for these medications     ibuprofen 600 MG tablet    sennosides 8.6 MG tablet                Protect others around you: Learn how to safely use, store and throw away your medicines at www.disposemymeds.org.             Medication List: This is a list of all your medications and when to take them. Check marks below indicate your daily home schedule. Keep this list as a reference.      Medications           Morning Afternoon Evening Bedtime As Needed    fluticasone 50 MCG/ACT spray   Commonly known as:  FLONASE   USE TWO SPRAY(S) IN EACH NOSTRIL ONCE DAILY                                HYDROcodone-acetaminophen 5-325 MG per tablet   Commonly known as:  NORCO   Take 1-2 tablets by mouth every 4 hours as needed for other (Moderate to Severe Pain)   Last time this was given:  1 tablet on 11/13/2017  9:19 AM                                ibuprofen 600 MG tablet   Commonly known as:  ADVIL/MOTRIN   Take 1 tablet (600 mg) by mouth every 6 hours as needed for pain (mild)   Last time this was given:  600 mg on 11/13/2017  9:19 AM                                Multi-vitamin Tabs tablet   Take 1 tablet by mouth daily                                NEW MED   Calcium supplement.  Unsure of dose                                omega 3 1000 MG Caps   Take 1 g by mouth daily                                sennosides 8.6 MG tablet   Commonly known as:  SENOKOT   Take 1-2 tablets by mouth  2 times daily as needed for constipation                                ZYRTEC ALLERGY PO   Take  by mouth. 1 tablet daily as needed

## 2017-11-13 NOTE — ANESTHESIA POSTPROCEDURE EVALUATION
Patient: Graeme Johnson    Procedure(s):  open repair epigastric and umbilical hernia with mesh     - Wound Class: I-Clean    Diagnosis:epigastric hernia  Diagnosis Additional Information: No value filed.    Anesthesia Type:  General, LMA    Note:  Anesthesia Post Evaluation    Patient location during evaluation: Phase 2  Patient participation: Able to fully participate in evaluation  Level of consciousness: awake  Pain management: adequate  Airway patency: patent  Cardiovascular status: acceptable  Respiratory status: acceptable  Hydration status: euvolemic  PONV: controlled     Anesthetic complications: None          Last vitals:  Vitals:    11/13/17 0915 11/13/17 0919 11/13/17 0930   BP: 109/71  103/68   Resp: 12 15 15   Temp:      SpO2: 99% 100% 97%         Electronically Signed By: Berhane Jiménez MD  November 13, 2017  10:29 AM

## 2017-11-13 NOTE — TELEPHONE ENCOUNTER
GENERAL SURGERY NURSE PHONE TRIAGE   Graeme Johnson    MRN# [id number]  AGE:  52 year old  YOB: 1965  [phone] (home)      Surgeon: Dr. Damian  Surgical Assist:  Mati Whaley PA-C     Surgery type: Umbilical and Epigastric Herniorrhaphies with Bard Ventralex ST Hernia Patch (6.4cm)      Surgery Date: November / 13 / 2017     POD: Today     CHIEF CONCERN:  Nausea with vomiting    Ron is calling.  Patient has nausea with vomiting.  Requesting a RX for Zofran.    then realized he has RX for Zofran- had surgery in the past 2 weeks.  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    PLAN:    or patient will call if vomiting or nausea continue.  Aviva Doss RN

## 2017-11-13 NOTE — OP NOTE
General Surgery Operative Note      Pre-operative diagnosis: Epigastric hernia   Post-operative diagnosis: Epigastric and umbilical hernias    Procedure: Umbilical and Epigastric Herniorrhaphies with Bard Ventralex ST Hernia Patch (6.4cm)    Surgeon: Luanne Damian MD   Assistant(s): Mati Whaley PA-C  The Physician Assistant was medically necessary for their expertise in prepping, suctioning, suturing and retraction.   Anesthesia: General    Estimated blood loss:   1 cc       DESCRIPTION OF PROCEDURE: The patient was placed on the table in supine position. The abdomen was prepped and draped in standard sterile fashion. A transverse incision just cephalad to the umbilicus was made with a blade. The incision was carried down into the subcutaneous tissue. The incarcerated hernia contents (preperitoneal fat) were  from surrounding subcutaneous tissue using blunt and sharp dissection and excised. The fascial defect was 1 cm. The preperitoneal space was developed circumferentially to allow for placement of the mesh patch.  A 3 mm fascial bridge was noted between a second, subcentimeter umbilical hernia.  We therefore extended the preperitoneal dissection caudally beyond the umbilical hernia defect.  A small tear in the peritoneum was closed with running 3-0 Vicryl suture.  A 6.4 cm Ventralex ST mesh patch was soaked in Irricept and rinsed with saline.  It was then positioned in the preperitoneal space such that it lay smoothly.  We then trimmed the leaflets at the level of the fascia and closed the fascia transversely with a running 0 PDS suture, incorporating the leaflets of the mesh. Hemostasis was maintained throughout with cautery. We irrigated the wound. The skin and subcutaneous tissue was anesthetized with local. The subcutaneous tissue was closed with interrupted 3-0 vicryl suture. The skin was closed with a running 4-0 Vicryl subcuticular suture and Dermabond. The patient tolerated the procedure  well. Sponge and instrument counts were correct.   Luanne Damian MD

## 2017-11-15 ENCOUNTER — TELEPHONE (OUTPATIENT)
Dept: FAMILY MEDICINE | Facility: CLINIC | Age: 52
End: 2017-11-15

## 2017-11-15 NOTE — TELEPHONE ENCOUNTER
FYI pt had surgery on the 13th,  is not taking the Norco, but taking the ibuprofen as prescribed.  Pt does drink a lot of caffeine daily but has not had any for a few days, is going to try an Excedrin migraine tablet to see if this helps with the HA.  Pt is not experiencing pain in the surgery site. If sx persist or worsen will contact surgeon or PCP for further F/U advice.   Joslyn Linda RN

## 2017-11-27 ENCOUNTER — OFFICE VISIT (OUTPATIENT)
Dept: SURGERY | Facility: CLINIC | Age: 52
End: 2017-11-27
Payer: COMMERCIAL

## 2017-11-27 VITALS
HEART RATE: 70 BPM | HEIGHT: 68 IN | DIASTOLIC BLOOD PRESSURE: 60 MMHG | BODY MASS INDEX: 21.22 KG/M2 | OXYGEN SATURATION: 97 % | SYSTOLIC BLOOD PRESSURE: 100 MMHG | WEIGHT: 140 LBS | RESPIRATION RATE: 16 BRPM

## 2017-11-27 DIAGNOSIS — Z09 SURGICAL FOLLOWUP VISIT: Primary | ICD-10-CM

## 2017-11-27 PROCEDURE — 99024 POSTOP FOLLOW-UP VISIT: CPT | Performed by: PHYSICIAN ASSISTANT

## 2017-11-27 NOTE — LETTER
2017    Re: Graeme Johnson - 1965    Dear Dr. Contreras,     I had the pleasure of seeing Graeme today in follow-up after her umbilical  ventral hernia repair with mesh on 2017 by Dr. Damian. The patient tolerated the procedure well. Graeme is happy to report that her preoperative symptoms have improved. She is now tolerating a regular diet. Graeme reports constipation following surgery with associated bloating and discomfort. She reports this is slowly improving with time and fiber supplements, although continues to come and go. She denies abdominal pain today.     On exam, the patient's incision is healing well without signs of infection. A normal healing ridge is present at the incision site, as expected. Her abdomen is soft and nontender.      Graeme is recovering well postoperatively. I recommended trialing a gentle laxative for constipation if needed, such as Miralax. We will be happy to see her in the future as needed. She was encouraged to call us with any questions or concerns.       Sincerely,         Radha Foss PA-C

## 2017-11-27 NOTE — PROGRESS NOTES
2017    Re:  Graeme Johnson   :  1965      Dear Dr. Contreras,    I had the pleasure of seeing Graeme today in follow-up after her umbilical  ventral hernia repair with mesh on 2017 by Dr. Damian. The patient tolerated the procedure well. Graeme is happy to report that her preoperative symptoms have improved. She is now tolerating a regular diet. Graeme reports constipation following surgery with associated bloating and discomfort. She reports this is slowly improving with time and fiber supplements, although continues to come and go. She denies abdominal pain today.    On exam, the patient's incision is healing well without signs of infection. A normal healing ridge is present at the incision site, as expected. Her abdomen is soft and nontender.     Graeme is recovering well postoperatively. I recommended trialing a gentle laxative for constipation if needed, such as Miralax. We will be happy to see her in the future as needed. She was encouraged to call us with any questions or concerns.      Sincerely,           Radha Foss PA-C      Please route or send letter to:  Primary Care Provider (PCP)

## 2017-11-27 NOTE — MR AVS SNAPSHOT
"              After Visit Summary   11/27/2017    Graeme Johnson    MRN: [id number]           Patient Information     Date Of Birth          1965        Visit Information        Provider Department      11/27/2017 3:30 PM Radha Foss PA-C [facility]      Today's Diagnoses     Surgical followup visit    -  1       Follow-ups after your visit        Who to contact     If you have questions or need follow up information about today's clinic visit or your schedule please contact [facility] directly at [phone].  Normal or non-critical lab and imaging results will be communicated to you by [employer], letter or phone within 4 business days after the clinic has received the results. If you do not hear from us within 7 days, please contact the clinic through [employer] or phone. If you have a critical or abnormal lab result, we will notify you by phone as soon as possible.  Submit refill requests through [employer] or call your pharmacy and they will forward the refill request to us. Please allow 3 business days for your refill to be completed.          Additional Information About Your Visit        MyChart Information     [employer] gives you secure access to your electronic health record. If you see a primary care provider, you can also send messages to your care team and make appointments. If you have questions, please call your primary care clinic.  If you do not have a primary care provider, please call [phone] and they will assist you.        Care EveryWhere ID     This is your Care EveryWhere ID. This could be used by other organizations to access your [facility] medical records  [id number]        Your Vitals Were     Pulse Respirations Height Last Period Pulse Oximetry Breastfeeding?    70 16 5' 8\" (1.727 m) 09/15/2017 97% No    BMI (Body Mass Index)                   21.29 kg/m2            Blood Pressure from Last 3 " Encounters:   11/27/17 100/60   11/13/17 106/68   11/06/17 98/56    Weight from Last 3 Encounters:   11/27/17 140 lb (63.5 kg)   11/13/17 140 lb (63.5 kg)   11/06/17 142 lb 14.4 oz (64.8 kg)              Today, you had the following     No orders found for display       Primary Care Provider Office Phone # Fax #    Nessa Contreras [phone] [id number]  [facility] [address]        Equal Access to Services     [facility]: Hadii aad ku hadasho Soomaali, waaxda luqadaha, qaybta kaalmada adeegyaaarti, aguila pascual . So [facility] [phone].    ATENCIÓN: Si habla español, tiene a romero disposición servicios gratuitos de asistencia lingüística. [facility] [phone].    We comply with applicable federal civil rights laws and [place] laws. We do not discriminate on the basis of race, color, national origin, age, disability, sex, sexual orientation, or gender identity.            Thank you!     Thank you for choosing [facility]  for your care. Our goal is always to provide you with excellent care. Hearing back from our patients is one way we can continue to improve our services. Please take a few minutes to complete the written survey that you may receive in the mail after your visit with us. Thank you!             Your Updated Medication List - Protect others around you: Learn how to safely use, store and throw away your medicines at www.disposemymeds.org.          This list is accurate as of: 11/27/17  4:02 PM.  Always use your most recent med list.                   Brand Name Dispense Instructions for use Diagnosis    fluticasone 50 MCG/ACT spray    FLONASE    1 Bottle    USE TWO SPRAY(S) IN EACH NOSTRIL ONCE DAILY    Seasonal allergic rhinitis       HYDROcodone-acetaminophen 5-325 MG per tablet    NORCO    20 tablet    Take 1-2 tablets by mouth every 4 hours as needed for other (Moderate to Severe Pain)    Epigastric hernia       ibuprofen 600 MG  tablet    ADVIL/MOTRIN    30 tablet    Take 1 tablet (600 mg) by mouth every 6 hours as needed for pain (mild)    Epigastric hernia       Multi-vitamin Tabs tablet     100 tablet    Take 1 tablet by mouth daily        NEW MED      Calcium supplement.  Unsure of dose        omega 3 1000 MG Caps     90 capsule    Take 1 g by mouth daily        sennosides 8.6 MG tablet    SENOKOT    60 tablet    Take 1-2 tablets by mouth 2 times daily as needed for constipation    Epigastric hernia       ZYRTEC ALLERGY PO      Take  by mouth. 1 tablet daily as needed

## 2018-06-30 ENCOUNTER — HEALTH MAINTENANCE LETTER (OUTPATIENT)
Age: 53
End: 2018-06-30

## 2018-08-24 ENCOUNTER — TRANSFERRED RECORDS (OUTPATIENT)
Dept: HEALTH INFORMATION MANAGEMENT | Facility: CLINIC | Age: 53
End: 2018-08-24

## 2018-08-24 ENCOUNTER — HOSPITAL PATHOLOGY (OUTPATIENT)
Dept: OTHER | Facility: CLINIC | Age: 53
End: 2018-08-24

## 2018-08-28 LAB — COPATH REPORT: NORMAL

## 2019-02-04 ENCOUNTER — TRANSFERRED RECORDS (OUTPATIENT)
Dept: HEALTH INFORMATION MANAGEMENT | Facility: CLINIC | Age: 54
End: 2019-02-04

## 2019-02-04 ENCOUNTER — HOSPITAL PATHOLOGY (OUTPATIENT)
Dept: OTHER | Facility: CLINIC | Age: 54
End: 2019-02-04

## 2019-02-06 LAB — COPATH REPORT: NORMAL

## 2019-03-04 ENCOUNTER — TELEPHONE (OUTPATIENT)
Dept: FAMILY MEDICINE | Facility: CLINIC | Age: 54
End: 2019-03-04

## 2019-03-04 ENCOUNTER — OFFICE VISIT (OUTPATIENT)
Dept: FAMILY MEDICINE | Facility: CLINIC | Age: 54
End: 2019-03-04
Payer: COMMERCIAL

## 2019-03-04 VITALS
BODY MASS INDEX: 21.75 KG/M2 | WEIGHT: 143.5 LBS | SYSTOLIC BLOOD PRESSURE: 110 MMHG | OXYGEN SATURATION: 99 % | TEMPERATURE: 98.2 F | HEIGHT: 68 IN | HEART RATE: 60 BPM | DIASTOLIC BLOOD PRESSURE: 72 MMHG | RESPIRATION RATE: 16 BRPM

## 2019-03-04 DIAGNOSIS — R23.9 SKIN CHANGE: Primary | ICD-10-CM

## 2019-03-04 DIAGNOSIS — Z12.31 ENCOUNTER FOR SCREENING MAMMOGRAM FOR BREAST CANCER: ICD-10-CM

## 2019-03-04 PROCEDURE — 99213 OFFICE O/P EST LOW 20 MIN: CPT | Performed by: NURSE PRACTITIONER

## 2019-03-04 ASSESSMENT — MIFFLIN-ST. JEOR: SCORE: 1304.41

## 2019-03-04 NOTE — PROGRESS NOTES
SUBJECTIVE:   Graeme Johnson is a 53 year old female who presents to clinic today for the following health issues:      Warts       Duration: within in the last month     Description (location/character/radiation): irritation between anus and vagina     Intensity:  moderate    Accompanying signs and symptoms: none    History (similar episodes/previous evaluation): None    Precipitating or alleviating factors: None    Therapies tried and outcome: None     Pt presents with concerns regarding small change in vaginal area.  She was recently seen by colorectal for known hx of distal rectal AIN-2.  Evaluation was normal.  Since that time, likely over the past few weeks, she has noticed a small lump in the vaginal area.  She would like to make sure today that this is not a wart.  It is painless.  She has not treated the area.  No drainage.  Otherwise well.  No changes in soaps, creams, detergents.  No new partners.   has had outbreaks of anal warts in the past.    Problem list and histories reviewed & adjusted, as indicated.  Additional history: as documented    Patient Active Problem List   Diagnosis     CARDIOVASCULAR SCREENING; LDL GOAL LESS THAN 160     Allergic state     Left hip pain     Left shoulder pain     Pain in joint, pelvic region and thigh     Ischial bursitis     Acetabular labrum tear     Primary osteoarthritis of left hip     AIN grade II     Past Surgical History:   Procedure Laterality Date     APPENDECTOMY       BIOPSY BREAST  2012     C NONSPECIFIC PROCEDURE      PE tubes     C NONSPECIFIC PROCEDURE      [place] teeth     COLONOSCOPY N/A 11/2/2015    Procedure: COMBINED COLONOSCOPY, SINGLE OR MULTIPLE BIOPSY/POLYPECTOMY BY BIOPSY;  Surgeon: Gonzalez Emmanuel MD, MD;  Location:  GI     ENT SURGERY      tonsilectomy 1969     HERNIORRHAPHY EPIGASTRIC N/A 11/13/2017    Procedure: HERNIORRHAPHY EPIGASTRIC;  open repair epigastric and umbilical hernia with mesh    ;  Surgeon: Luanne Damian,  "MD;  Location: [facility] OR     SURGICAL HISTORY OF -       venous ablation right leg       Social History     Tobacco Use     Smoking status: Former Smoker     Last attempt to quit: 1979     Years since quittin.2     Smokeless tobacco: Never Used     Tobacco comment: smoked a little as a teenager   Substance Use Topics     Alcohol use: Yes     Alcohol/week: 1.0 - 1.5 oz     Types: 2 - 3 Standard drinks or equivalent per week     Comment: 1 glass per week     Family History   Problem Relation Age of Onset     Alcohol/Drug Father      Cancer Father         skin     Hypertension Father      Lipids Father      Neurologic Disorder Father         TIA's; dementia     Respiratory Father         copd     Heart Disease Father         CHF; a. fib/brain hemorrhage     Hypertension Maternal Grandmother      Heart Disease Maternal Grandmother         brain hemorrhage     Diabetes Maternal Grandfather      C.A.D. Maternal Grandfather      Cancer Maternal Grandfather         Bladder     Psychotic Disorder Sister         depression           Reviewed and updated as needed this visit by clinical staff       Reviewed and updated as needed this visit by Provider         ROS:  SEE HPI.    OBJECTIVE:     /72 (BP Location: Right arm, Patient Position: Chair, Cuff Size: Adult Regular)   Pulse 60   Temp 98.2  F (36.8  C) (Tympanic)   Resp 16   Ht 1.727 m (5' 8\")   Wt 65.1 kg (143 lb 8 oz)   LMP 12/15/2018 (Approximate)   SpO2 99%   BMI 21.82 kg/m    Body mass index is 21.82 kg/m .  GENERAL: healthy, alert and no distress   (female): normal female external genitalia and L side labia, 2mm papule, not vesicular.  Slight pustular appearance.  No surrounding erythema or induration.  No ttp.  PSYCH: mentation appears normal, affect normal/bright    Diagnostic Test Results:  none     ASSESSMENT/PLAN:   1. Skin change  53 y.o. Female, hx of distal rectal AIN-2 here today with concerns regarding recent skin change.  Exam shows " what appears to be a clogged follicle without signs of infection.  This does not appear to be a wart.  Recommended warm compresses and monitoring.  Return to clinic if symptoms persist or worsen.    Pt agrees with plan and verbalized understanding.    DEYA Sanders Ra, CNP  [facility]

## 2019-03-04 NOTE — TELEPHONE ENCOUNTER
"Patient can feel a nodule in her vaginal area. Has a h/o vaginal warts from long ago. Doesn't seem to be the same as then. Is concerned about spreading anything to her .   Also had a positive result on colonoscoscopy so is concerned about any growths \"down below\".   Additionally, she is overdue for Pap, wondering if she could add that to her appt.   She would like to be seen today in [place] if Dr. Contreras is not available, as she is leaving on vacation tomorrow and lives near [place].    Appt today is appropriate. Transferred to scheduling to look for open appt times in [place].    Sandra MIMS, Triage RN    "

## 2019-03-29 DIAGNOSIS — Z12.31 ENCOUNTER FOR SCREENING MAMMOGRAM FOR BREAST CANCER: ICD-10-CM

## 2019-03-29 PROCEDURE — 77063 BREAST TOMOSYNTHESIS BI: CPT | Mod: TC

## 2019-03-29 PROCEDURE — 77067 SCR MAMMO BI INCL CAD: CPT | Mod: TC

## 2019-08-14 ENCOUNTER — ANCILLARY PROCEDURE (OUTPATIENT)
Dept: GENERAL RADIOLOGY | Facility: CLINIC | Age: 54
End: 2019-08-14
Attending: PODIATRIST
Payer: COMMERCIAL

## 2019-08-14 ENCOUNTER — OFFICE VISIT (OUTPATIENT)
Dept: PODIATRY | Facility: CLINIC | Age: 54
End: 2019-08-14
Payer: COMMERCIAL

## 2019-08-14 VITALS
WEIGHT: 143 LBS | BODY MASS INDEX: 21.67 KG/M2 | DIASTOLIC BLOOD PRESSURE: 62 MMHG | HEIGHT: 68 IN | SYSTOLIC BLOOD PRESSURE: 118 MMHG

## 2019-08-14 DIAGNOSIS — M79.671 RIGHT FOOT PAIN: Primary | ICD-10-CM

## 2019-08-14 DIAGNOSIS — M19.071 PRIMARY OSTEOARTHRITIS OF RIGHT FOOT: ICD-10-CM

## 2019-08-14 DIAGNOSIS — M79.671 RIGHT FOOT PAIN: ICD-10-CM

## 2019-08-14 DIAGNOSIS — M20.5X1 HALLUX LIMITUS OF RIGHT FOOT: ICD-10-CM

## 2019-08-14 PROCEDURE — 73630 X-RAY EXAM OF FOOT: CPT | Mod: RT

## 2019-08-14 PROCEDURE — 99203 OFFICE O/P NEW LOW 30 MIN: CPT | Performed by: PODIATRIST

## 2019-08-14 ASSESSMENT — MIFFLIN-ST. JEOR: SCORE: 1297.14

## 2019-08-14 NOTE — PROGRESS NOTES
PATIENT HISTORY:  Graeme Johnson is a 54 year old female who presents to clinic for pain to right great toe, in the joint. Notes it has been on and off for 5 years. Liv specific injury. Pain is worse depending on shoes. Will throb or be sharp. Currently 1/10 but can be 4/10. Has tired icing and new shoes. Wondering what is causing it and what can be done for it.     Review of Systems:  Patient denies fever, chills, rash, wound, numbness, weakness, heart burn, blood in stool, chest pain with activity, calf pain when walking, shortness of breath with activity, chronic cough, easy bleeding/bruising, swelling of ankles, excessive thirst, fatigue, depression, anxiety.  Patient admits to limping at times, stiffness.     PAST MEDICAL HISTORY:   Past Medical History:   Diagnosis Date     AIN grade II      Allergy         PAST SURGICAL HISTORY:   Past Surgical History:   Procedure Laterality Date     APPENDECTOMY       BIOPSY BREAST  2012     C NONSPECIFIC PROCEDURE      PE tubes     C NONSPECIFIC PROCEDURE      [place] teeth     COLONOSCOPY N/A 11/2/2015    Procedure: COMBINED COLONOSCOPY, SINGLE OR MULTIPLE BIOPSY/POLYPECTOMY BY BIOPSY;  Surgeon: Gonzalez Emmanuel MD, MD;  Location:  GI     ENT SURGERY      tonsilectomy 1969     HERNIORRHAPHY EPIGASTRIC N/A 11/13/2017    Procedure: HERNIORRHAPHY EPIGASTRIC;  open repair epigastric and umbilical hernia with mesh    ;  Surgeon: Luanne Damian MD;  Location:  OR     SURGICAL HISTORY OF -   2011    venous ablation right leg        MEDICATIONS:   Current Outpatient Medications:      Cetirizine HCl (ZYRTEC ALLERGY PO), Take  by mouth. 1 tablet daily as needed, Disp: , Rfl:      fluticasone (FLONASE) 50 MCG/ACT spray, USE TWO SPRAY(S) IN EACH NOSTRIL ONCE DAILY, Disp: 1 Bottle, Rfl: 11     ibuprofen (ADVIL/MOTRIN) 600 MG tablet, Take 1 tablet (600 mg) by mouth every 6 hours as needed for pain (mild), Disp: 30 tablet, Rfl: 0     multivitamin, therapeutic with minerals  (MULTI-VITAMIN) TABS, Take 1 tablet by mouth daily, Disp: 100 tablet, Rfl: 3     NEW MED, Calcium supplement.  Unsure of dose, Disp: , Rfl:      omega 3 1000 MG CAPS, Take 1 g by mouth daily, Disp: 90 capsule, Rfl: 0     ALLERGIES:    Allergies   Allergen Reactions     No Known Drug Allergies         SOCIAL HISTORY:   Social History     Socioeconomic History     Marital status:      Spouse name: Not on file     Number of children: Not on file     Years of education: Not on file     Highest education level: Not on file   Occupational History     Occupation: Clerical   Social Needs     Financial resource strain: Not on file     Food insecurity:     Worry: Not on file     Inability: Not on file     Transportation needs:     Medical: Not on file     Non-medical: Not on file   Tobacco Use     Smoking status: Former Smoker     Last attempt to quit: 1979     Years since quittin.6     Smokeless tobacco: Never Used     Tobacco comment: smoked a little as a teenager   Substance and Sexual Activity     Alcohol use: Yes     Alcohol/week: 1.0 - 1.5 oz     Types: 2 - 3 Standard drinks or equivalent per week     Comment: 1 glass per week     Drug use: No     Sexual activity: Yes     Partners: Male     Birth control/protection: Surgical     Comment: vasectomy   Lifestyle     Physical activity:     Days per week: Not on file     Minutes per session: Not on file     Stress: Not on file   Relationships     Social connections:     Talks on phone: Not on file     Gets together: Not on file     Attends [employer]ical service: Not on file     Active member of club or organization: Not on file     Attends meetings of clubs or organizations: Not on file     Relationship status: Not on file     Intimate partner violence:     Fear of current or ex partner: Not on file     Emotionally abused: Not on file     Physically abused: Not on file     Forced sexual activity: Not on file   Other Topics Concern      Service Not Asked  "    Blood Transfusions Not Asked     Caffeine Concern Not Asked     Occupational Exposure Not Asked     Hobby Hazards Not Asked     Sleep Concern Not Asked     Stress Concern Not Asked     Weight Concern Not Asked     Special Diet No     Comment: Gets some variety.     Back Care Not Asked     Exercise No     Comment: will go in spurts     Bike Helmet Not Asked     Seat Belt Not Asked     Self-Exams Not Asked     Parent/sibling w/ CABG, MI or angioplasty before 65F 55M? No   Social History Narrative     Not on file        FAMILY HISTORY:   Family History   Problem Relation Age of Onset     Alcohol/Drug Father      Cancer Father         skin     Hypertension Father      Lipids Father      Neurologic Disorder Father         TIA's; dementia     Respiratory Father         copd     Heart Disease Father         CHF; a. fib/brain hemorrhage     Hypertension Maternal Grandmother      Heart Disease Maternal Grandmother         brain hemorrhage     Diabetes Maternal Grandfather      C.A.D. Maternal Grandfather      Cancer Maternal Grandfather         Bladder     Psychotic Disorder Sister         depression        EXAM:Vitals: Ht 1.727 m (5' 8\")   Wt 64.9 kg (143 lb)   BMI 21.74 kg/m    BMI= Body mass index is 21.74 kg/m .    General appearance: Patient is alert and fully cooperative with history & exam.  No sign of distress is noted during the visit.     Psychiatric: Affect is pleasant & appropriate.  Patient appears motivated to improve health.     Respiratory: Breathing is regular & unlabored while sitting.     HEENT: Hearing is intact to spoken word.  Speech is clear.  No gross evidence of visual impairment that would impact ambulation.     Dermatologic: Skin is intact to both lower extremities without significant lesions, rash or abrasion.  No paronychia or evidence of soft tissue infection is noted.     Vascular: DP & PT pulses are intact & regular bilaterally.  No significant edema or varicosities noted.  CFT and skin " temperature is normal to both lower extremities.     Neurologic: Lower extremity sensation is intact to light touch.  No evidence of weakness or contracture in the lower extremities.  No evidence of neuropathy.     Musculoskeletal: Patient is ambulatory without assistive device or brace.  Decrease range of motion of right 1st metatarsal phalangeal joint with pain and crepitus    Radiographs:  Right foot xrays - Degenerative changes noted to 1st metatarsal phalangeal joint.     ASSESSMENT:    Right foot pain  Hallux limitus of right foot  Primary osteoarthritis of right foot     PLAN:  Reviewed [facility] in [facility]. Reviewed xrays.  Reviewed and discussed causes of hallux limitus with patient.  Explained that it is a progressive arthritis meaning that over time, there is decrease in the joint space as well as bony spurring that occurs which leads to pain in the big toe especially with bending motions of the big toe joint.    Discussed treatment options with patient including rigid soled shoes or orthotics that are stiff under the big toe that help to prevent motion at that joint which is leading to pain and inflammation.  We discussed that sometimes cortisone injections can help with the pain or physical therapy treatments such as ultrasound.  Discussed that this is normally a structural issue in the foot and if conservative therapy doesn't work, surgery is considered.    We discussed that depending on the quality of the cartilage and/or of the joint determines if patient will need a joint sparing or fusion procedure.  Discussed that this can usually not be determined by x-ray and is an intra- op position.  With joint sparing procedure, and implant is placed in the joint to try to help keep the range of motion at that the toe. Patient is normally minimally weight bearing in a cam boot for 3 weeks.  With fusion, patient is normally non weight bearing for 2 weeks, then minimal weight bearing for 4 weeks in boot.      She is going to try conservative treatments for now. Not interested in an injection at this time.        Elise Burgess DPM, Podiatry/Foot and Ankle Surgery

## 2019-08-14 NOTE — LETTER
8/14/2019         RE: Graeme Johnson  [address] [facility] [address]        Dear Colleague,    Thank you for referring your patient, Graeme Johnson, to the [facility]. Please see a copy of my visit note below.    PATIENT HISTORY:  Graeme Johnson is a 54 year old female who presents to clinic for pain to right great toe, in the joint. Notes it has been on and off for 5 years. Liv specific injury. Pain is worse depending on shoes. Will throb or be sharp. Currently 1/10 but can be 4/10. Has tired icing and new shoes. Wondering what is causing it and what can be done for it.     Review of Systems:  Patient denies fever, chills, rash, wound, numbness, weakness, heart burn, blood in stool, chest pain with activity, calf pain when walking, shortness of breath with activity, chronic cough, easy bleeding/bruising, swelling of ankles, excessive thirst, fatigue, depression, anxiety.  Patient admits to limping at times, stiffness.     PAST MEDICAL HISTORY:   Past Medical History:   Diagnosis Date     AIN grade II      Allergy         PAST SURGICAL HISTORY:   Past Surgical History:   Procedure Laterality Date     APPENDECTOMY       BIOPSY BREAST  2012     C NONSPECIFIC PROCEDURE      PE tubes     C NONSPECIFIC PROCEDURE      [place] teeth     COLONOSCOPY N/A 11/2/2015    Procedure: COMBINED COLONOSCOPY, SINGLE OR MULTIPLE BIOPSY/POLYPECTOMY BY BIOPSY;  Surgeon: Gonzalez Emmanuel MD, MD;  Location:  GI     ENT SURGERY      tonsilectomy 1969     HERNIORRHAPHY EPIGASTRIC N/A 11/13/2017    Procedure: HERNIORRHAPHY EPIGASTRIC;  open repair epigastric and umbilical hernia with mesh    ;  Surgeon: Luanne Damian MD;  Location:  OR     SURGICAL HISTORY OF -   2011    venous ablation right leg        MEDICATIONS:   Current Outpatient Medications:      Cetirizine HCl (ZYRTEC ALLERGY PO), Take  by mouth. 1 tablet daily as needed, Disp: , Rfl:      fluticasone (FLONASE) 50 MCG/ACT spray,  USE TWO SPRAY(S) IN EACH NOSTRIL ONCE DAILY, Disp: 1 Bottle, Rfl: 11     ibuprofen (ADVIL/MOTRIN) 600 MG tablet, Take 1 tablet (600 mg) by mouth every 6 hours as needed for pain (mild), Disp: 30 tablet, Rfl: 0     multivitamin, therapeutic with minerals (MULTI-VITAMIN) TABS, Take 1 tablet by mouth daily, Disp: 100 tablet, Rfl: 3     NEW MED, Calcium supplement.  Unsure of dose, Disp: , Rfl:      omega 3 1000 MG CAPS, Take 1 g by mouth daily, Disp: 90 capsule, Rfl: 0     ALLERGIES:    Allergies   Allergen Reactions     No Known Drug Allergies         SOCIAL HISTORY:   Social History     Socioeconomic History     Marital status:      Spouse name: Not on file     Number of children: Not on file     Years of education: Not on file     Highest education level: Not on file   Occupational History     Occupation: Clerical   Social Needs     Financial resource strain: Not on file     Food insecurity:     Worry: Not on file     Inability: Not on file     Transportation needs:     Medical: Not on file     Non-medical: Not on file   Tobacco Use     Smoking status: Former Smoker     Last attempt to quit: 1979     Years since quittin.6     Smokeless tobacco: Never Used     Tobacco comment: smoked a little as a teenager   Substance and Sexual Activity     Alcohol use: Yes     Alcohol/week: 1.0 - 1.5 oz     Types: 2 - 3 Standard drinks or equivalent per week     Comment: 1 glass per week     Drug use: No     Sexual activity: Yes     Partners: Male     Birth control/protection: Surgical     Comment: vasectomy   Lifestyle     Physical activity:     Days per week: Not on file     Minutes per session: Not on file     Stress: Not on file   Relationships     Social connections:     Talks on phone: Not on file     Gets together: Not on file     Attends Scientologist service: Not on file     Active member of club or organization: Not on file     Attends meetings of clubs or organizations: Not on file     Relationship status:  "Not on file     Intimate partner violence:     Fear of current or ex partner: Not on file     Emotionally abused: Not on file     Physically abused: Not on file     Forced sexual activity: Not on file   Other Topics Concern      Service Not Asked     Blood Transfusions Not Asked     Caffeine Concern Not Asked     Occupational Exposure Not Asked     Hobby Hazards Not Asked     Sleep Concern Not Asked     Stress Concern Not Asked     Weight Concern Not Asked     Special Diet No     Comment: Gets some variety.     Back Care Not Asked     Exercise No     Comment: will go in spurts     Bike Helmet Not Asked     Seat Belt Not Asked     Self-Exams Not Asked     Parent/sibling w/ CABG, MI or angioplasty before 65F 55M? No   Social History Narrative     Not on file        FAMILY HISTORY:   Family History   Problem Relation Age of Onset     Alcohol/Drug Father      Cancer Father         skin     Hypertension Father      Lipids Father      Neurologic Disorder Father         TIA's; dementia     Respiratory Father         copd     Heart Disease Father         CHF; a. fib/brain hemorrhage     Hypertension Maternal Grandmother      Heart Disease Maternal Grandmother         brain hemorrhage     Diabetes Maternal Grandfather      C.A.D. Maternal Grandfather      Cancer Maternal Grandfather         Bladder     Psychotic Disorder Sister         depression        EXAM:Vitals: Ht 1.727 m (5' 8\")   Wt 64.9 kg (143 lb)   BMI 21.74 kg/m     BMI= Body mass index is 21.74 kg/m .    General appearance: Patient is alert and fully cooperative with history & exam.  No sign of distress is noted during the visit.     Psychiatric: Affect is pleasant & appropriate.  Patient appears motivated to improve health.     Respiratory: Breathing is regular & unlabored while sitting.     HEENT: Hearing is intact to spoken word.  Speech is clear.  No gross evidence of visual impairment that would impact ambulation.     Dermatologic: Skin is intact " to both lower extremities without significant lesions, rash or abrasion.  No paronychia or evidence of soft tissue infection is noted.     Vascular: DP & PT pulses are intact & regular bilaterally.  No significant edema or varicosities noted.  CFT and skin temperature is normal to both lower extremities.     Neurologic: Lower extremity sensation is intact to light touch.  No evidence of weakness or contracture in the lower extremities.  No evidence of neuropathy.     Musculoskeletal: Patient is ambulatory without assistive device or brace.  Decrease range of motion of right 1st metatarsal phalangeal joint with pain and crepitus    Radiographs:  Right foot xrays - Degenerative changes noted to 1st metatarsal phalangeal joint.     ASSESSMENT:    Right foot pain  Hallux limitus of right foot  Primary osteoarthritis of right foot     PLAN:  Reviewed patient's chart in [facility]. Reviewed xrays.  Reviewed and discussed causes of hallux limitus with patient.  Explained that it is a progressive arthritis meaning that over time, there is decrease in the joint space as well as bony spurring that occurs which leads to pain in the big toe especially with bending motions of the big toe joint.    Discussed treatment options with patient including rigid soled shoes or orthotics that are stiff under the big toe that help to prevent motion at that joint which is leading to pain and inflammation.  We discussed that sometimes cortisone injections can help with the pain or physical therapy treatments such as ultrasound.  Discussed that this is normally a structural issue in the foot and if conservative therapy doesn't work, surgery is considered.    We discussed that depending on the quality of the cartilage and/or of the joint determines if patient will need a joint sparing or fusion procedure.  Discussed that this can usually not be determined by x-ray and is an intra- op position.  With joint sparing procedure, and implant is placed in  the joint to try to help keep the range of motion at that the toe. Patient is normally minimally weight bearing in a cam boot for 3 weeks.  With fusion, patient is normally non weight bearing for 2 weeks, then minimal weight bearing for 4 weeks in boot.     She is going to try conservative treatments for now. Not interested in an injection at this time.        Elise Burgess DPM, Podiatry/Foot and Ankle Surgery          Again, thank you for allowing me to participate in the care of your patient.        Sincerely,        Elise Burgess DPM, Podiatry/Foot and Ankle Surgery

## 2019-08-14 NOTE — PATIENT INSTRUCTIONS
"Thank you for choosing [facility]!    DR. [facility]S CLINIC SCHEDULE  MONDAY AM - [place] TUESDAY - [place]   [address] [address] [address] [address] [address] [place] [address]   [phone] / [phone] / [phone]       WEDNESDAY - [place] FRIDAY AM - [facility]   [address]   [place] [address] [place] [address]   [phone] / [phone]       FRIDAY PM - [place] SCHEDULE SURGERY: [phone]   [address] BILLING QUESTIONS: [phone]   [place] [address] AFTER HOURS: [phone] / [phone] APPOINTMENTS: [phone]     [employer] (CPL) [phone]       DEGENERATIVE ARTHRITIS OF THE BIG TOE JOINT   (hallux limitus/hallux rigidus)   Arthritis of the joint at the base of the big toe (metatarsophalangeal joint) has several causes. Usually it results from repetitive trauma to the joint, secondary to abnormal foot mechanics. Often it is hereditary. However, a one-time traumatic event can lead to arthritis. The condition doesn't improve with time, and even with treatment, can worsen. The cartilage wears out, joint surfaces are no longer smooth, bone rubs on bone, inflammation occurs with pain, and eventually bone spurs and loose fragments might develop.   The joint is often painful with activity, worse with flimsy shoes or walking barefoot, and it slowly progresses over time. A person might notice the toe \"locking up\" with walking. There often is an obvious, and irritating, bony bump on top of the foot. Shoes might be uncomfortable. In some people the pain is so bothersome that recreational activities sometimes even normal daily activities are difficult to perform.   The pain from this arthritis is likely a combination of joint jamming, cartilage loss and inflammation, and irritation from shoes rubbing on the bump. Sometimes other parts of the " foot, leg, or back hurt from altering one's walk to compensate for the painful joint.     Ways to help a person live with the discomfort include wearing a good, supportive shoe with a rigid, rocker-type bottom. An example is a hiking boot. A rigid sole minimizes bending of the joint, and therefore, joint motion and pain. Shoes with a high toe box allow for less rubbing on the bump. Avoiding barefoot walking, sandals, flip-flops and slippers usually helps.   Sometimes an insert or orthotic provides symptom relief. This might make shoe fit more difficult. Pads over the bump and occasionally injections into the joint provide relief.   Surgery for this condition is aimed towards alleviating pain. It does not cure the arthritis nor does it guarantee better joint motion. Depending on the condition of the metatarsophalangeal joint, there are several surgiqal options:    1.  Cutting off the bony bump(s) and cleaning the joint    2.  Loosening the joint up by making cuts in the first metatarsal bone or the big toe bone and removing a small section of bone.    3.  Repositioning bone to minimize jamming of the joint.    4.  In severe cases, the joint is fused. By fusing the joint, it will never bend again. This resolves the pain, because it's the movement of a worn out joint that causes pain. Oftentimes the operation involves a combination of these procedures and. requires the use of screws, pins, and/or a small surgical plate.     Healing after surgery requires about six weeks of protection. This allows the bone to heal. Maximum recovery takes about one year. The scar tissue and joint structures require this amount of time to finish the healing process. Expect stiffness, swelling and numbness during that time frame.   Surgery for arthritis of the metatarsophalangeal joint does involve side effects. Some side effects are predictable and others are less common but do occur. A scar will be visible and could be irritated by  shoes. The shoe may rub on the screw or internal pin requiring surgical removal of these fixation devices. The screw and pin would likely be left in place for a full year. The first toe may remain stiff after surgery. The amount of stiffness is variable. Most people never regain normal motion of the first toe. This is due to scar tissue inherent to any surgery, in addition to the cumUlative effects of arthritis. Sometimes the big toe drifts to one side or the other. Joint fusion is one option to correct an unstable, drifting toe. This procedure straightens the toe, however, no motion remains.   All surgical procedures involve risk of infection, numbness, pain, delayed bone healing, osteotomy (bone cut) dislocation, blood clots, continued foot pain, etc. Arthritic joint surgery is quite complex and should not be taken lightly.    Any skin incision can lead to infection. Deep infection might involve the bone and thus repeat surgery and six weeks of IV antibiotics. Scar tissue can cause nerve pain or numbness. his is generally temporary but can be permanent. We do not have treatments that cure nerve problems. Second toe pain could be related to altered mechanics and pressure transferred to the second toe. Delayed bone healing would lengthen the healing time. Some bones simply do not heal. This requires repeat surgery, electronic bone stimulation and/or extended protection. Smokers have an approximate 20% chance of poor bone healing. This is double that of a non-smoker. The bone cut may displace. This may need to be repaired with a second operation. Displacement can cause joint malalignment. Immobility after surgery can cause a blood clot in the legs and lungs. This could result in death.   Foot pain is complex. Most feet hurt for more than one reason. Operating on the arthritic   big toe joint will not necessarily create a pain free foot. Appropriate shoes, healthy body weight, avoidance of bare foot walking and  moderation of activity will always be   necessary to enjoy foot comfort. Arthritis is incurable even with surgery.     Surgery for this type of arthritis is nevertheless quite successful. Most surgical patients are pleased with their foot following surgery. Many of the issues described above can be controlled by taking proper care of your foot during the healing process.   Cosmetic bump surgery is discouraged for the reasons listed above. A bump and joint that is comfortable when wearing appropriate shoes should simply be treated with appropriate shoes.   Your surgeon would be happy to fully describe any of the above issues. You should pursue a full understanding of the operation, recovery process and any potential problems that could develop.     OSTEOARTHRITIS OF THE FOOT & ANKLE  Osteoarthritis is a condition characterized by the breakdown and eventual loss of cartilage in one or more joints. Cartilage (the connective tissue found at the end of the bones in the joints) protects and cushions the bones during movement. When cartilage deteriorates or is lost, symptoms develop that can restrict one s ability to easily perform daily activities.  Osteoarthritis is also known as degenerative arthritis, reflecting its nature to develop as part of the aging process. As the most common form of arthritis, osteoarthritis affects millions of Americans. Some people refer to osteoarthritis simply as arthritis, even though there are many different types of arthritis.  Osteoarthritis appears at various joints throughout the body, including the hands, feet, spine, hips, and knees. In the foot, the disease most frequently occurs in the big toe, although it is also often found in the midfoot and ankle.  CAUSES  Osteoarthritis is considered a  wear and tear  disease because the cartilage in the joint wears down with repeated stress and use over time. As the cartilage deteriorates and gets thinner, the bones lose their protective  covering and eventually may rub together, causing pain and inflammation of the joint.  An injury may also lead to osteoarthritis, although it may take months or years after the injury for the condition to develop. For example, osteoarthritis in the big toe is often caused by kicking or jamming the toe, or by dropping something on the toe. Osteoarthritis in the midfoot is often caused by dropping something on it, or by a sprain or fracture. In the ankle, osteoarthritis is usually caused by a fracture and occasionally by a severe sprain.  Sometimes osteoarthritis develops as a result of abnormal foot mechanics such as flat feet or high arches. A flat foot causes less stability in the ligaments (bands of tissue that connect bones), resulting in excessive strain on the joints, which can cause arthritis. A high arch is rigid and lacks mobility, causing a jamming of joints that creates an increased risk of arthritis.  SYMPTOMS  People with osteoarthritis in the foot or ankle experience, in varying degrees, one or more of the following: Pain and stiffness in the joint, swelling in or near the joint, or difficulty walking or bending the joint.   Some patients with osteoarthritis also develop a bone spur (a bony protrusion) at the affected joint. Shoe pressure may cause pain at the site of a bone spur, and in some cases blisters or calluses may form over its surface. Bone spurs can also limit the movement of the joint.    DIAGNOSIS  In diagnosing osteoarthritis, the foot and ankle surgeon will examine the foot thoroughly, looking for swelling in the joint, limited mobility, and pain with movement. In some cases, deformity and/or enlargement (spur) of the joint may be noted. X-rays may be ordered to evaluate the extent of the disease.  NON-SURGICAL TREATMENT  To help relieve symptoms, the surgeon may begin treating osteoarthritis with one or more of the following non-surgical approaches:  Oral medications. Nonsteroidal  anti-inflammatory drugs (NSAIDs), such as ibuprofen, are often helpful in reducing the inflammation and pain. Occasionally a prescription for a steroid medication is needed to adequately reduce symptoms.   Orthotic devices. Custom orthotic devices (shoe inserts) are often prescribed to provide support to improve the foot s mechanics or cushioning to help minimize pain.   Bracing. Bracing, which restricts motion and supports the joint, can reduce pain during walking and help prevent further deformity.   Immobilization. Protecting the foot from movement by wearing a cast or removable cast-boot may be necessary to allow the inflammation to resolve.   Steroid injections. In some cases, steroid injections are applied to the affected joint to deliver anti-inflammatory medication.   Physical therapy. Exercises to strengthen the muscles, especially when the osteoarthritis occurs in the ankle, may give the patient greater stability and help avoid injury that might worsen the condition.   DO I NEED SURGERY?  When osteoarthritis has progressed substantially or failed to improve with non-surgical treatment, surgery may be recommended. In advanced cases, surgery may be the only option. The goal of surgery is to decrease pain and improve function. The foot and ankle surgeon will consider a number of factors when selecting the procedure best suited to the patient s condition and lifestyle.        BODY WEIGHT AND YOUR FEET  The following information is included in the after visit summary for all patients. Body weight can be a sensitive issue to discuss in clinic, but we think the following information is very important. Although we focus on the feet and ankles, we do support the overall health of our patients.     Many things can cause foot and ankle problems. Foot structure, activity level, foot mechanics and injuries are common causes of pain. One very important issue that often goes unmentioned, is body weight. Extra weight can  cause increased stress on muscles, ligaments, bones and tendons. Sometimes just a few extra pounds is all it takes to put one over her/his threshold. Without reducing that stress, it can be difficult to alleviate pain. As Foot & Ankle specialists, our job is addressing the lower extremity problem and possible causes. Regarding extra body weight, we encourage patients to discuss diet and weight management plans with their primary care doctors. It is this team approach that gives you the best opportunity for pain relief and getting you back on your feet.      [facility] has a Comprehensive Weight Management Program. This program includes counseling, education, non-surgical and surgical approaches to weight loss. If you are interested in learning more either talk to you primary care provider or call [phone].

## 2019-09-18 ENCOUNTER — OFFICE VISIT (OUTPATIENT)
Dept: PEDIATRICS | Facility: CLINIC | Age: 54
End: 2019-09-18
Payer: COMMERCIAL

## 2019-09-18 DIAGNOSIS — N30.01 ACUTE CYSTITIS WITH HEMATURIA: Primary | ICD-10-CM

## 2019-09-18 DIAGNOSIS — R82.90 NONSPECIFIC FINDING ON EXAMINATION OF URINE: ICD-10-CM

## 2019-09-18 DIAGNOSIS — R30.9 PAINFUL URINATION: ICD-10-CM

## 2019-09-18 LAB

## 2019-09-18 PROCEDURE — 87086 URINE CULTURE/COLONY COUNT: CPT | Performed by: NURSE PRACTITIONER

## 2019-09-18 PROCEDURE — 99213 OFFICE O/P EST LOW 20 MIN: CPT | Performed by: NURSE PRACTITIONER

## 2019-09-18 PROCEDURE — 87088 URINE BACTERIA CULTURE: CPT | Performed by: NURSE PRACTITIONER

## 2019-09-18 PROCEDURE — 81001 URINALYSIS AUTO W/SCOPE: CPT | Performed by: NURSE PRACTITIONER

## 2019-09-18 PROCEDURE — 87186 SC STD MICRODIL/AGAR DIL: CPT | Performed by: NURSE PRACTITIONER

## 2019-09-18 RX ORDER — NITROFURANTOIN 25; 75 MG/1; MG/1
100 CAPSULE ORAL 2 TIMES DAILY
Qty: 10 CAPSULE | Refills: 0 | Status: SHIPPED | OUTPATIENT
Start: 2019-09-18 | End: 2019-12-12

## 2019-09-18 ASSESSMENT — MIFFLIN-ST. JEOR: SCORE: 1306.22

## 2019-09-18 NOTE — PATIENT INSTRUCTIONS
"Patient Education     Bladder Infection, Female (Adult)    Urine is normally doesn't have any bacteria in it. But bacteria can get into the urinary tract from the skin around the rectum. Or they can travel in the blood from elsewhere in the body. Once they are in your urinary tract, they can cause infection in the urethra (urethritis), the bladder (cystitis), or the kidneys (pyelonephritis).  The most common place for an infection is in the bladder. This is called a bladder infection. This is one of the most common infections in women. Most bladder infections are easily treated. They are not serious unless the infection spreads to the kidney.  The phrases \"bladder infection,\" \"UTI,\" and \"cystitis\" are often used to describe the same thing. But they are not always the same. Cystitis is an inflammation of the bladder. The most common cause of cystitis is an infection.  Symptoms  The infection causes inflammation in the urethra and bladder. This causes many of the symptoms. The most common symptoms of a bladder infection are:    Pain or burning when urinating    Having to urinate more often than usual    Urgent need to urinate    Only a small amount of urine comes out    Blood in urine    Abdominal discomfort. This is usually in the lower abdomen above the pubic bone.    Cloudy urine    Strong- or bad-smelling urine    Unable to urinate (urinary retention)    Unable to hold urine in (urinary incontinence)    Fever    Loss of appetite    Confusion (in older adults)  Causes  Bladder infections are not contagious. You can't get one from someone else, from a toilet seat, or from sharing a bath.  The most common cause of bladder infections is bacteria from the bowels. The bacteria get onto the skin around the opening of the urethra. From there, they can get into the urine and travel up to the bladder, causing inflammation and infection. This usually happens because of:    Wiping improperly after urinating. Always wipe from " front to back.    Bowel incontinence    Pregnancy    Procedures such as having a catheter inserted    Older age    Not emptying your bladder. This can allow bacteria a chance to grow in your urine.    Dehydration    Constipation    Sex    Use of a diaphragm for birth control   Treatment  Bladder infections are diagnosed by a urine test. They are treated with antibiotics and usually clear up quickly without complications. Treatment helps prevent a more serious kidney infection.  Medicines  Medicines can help in the treatment of a bladder infection:    Take antibiotics until they are used up, even if you feel better. It is important to finish them to make sure the infection has cleared.    You can use acetaminophen or ibuprofen for pain, fever, or discomfort, unless another medicine was prescribed. If you have chronic liver or kidney disease, talk with your healthcare provider before using these medicines. Also talk with your provider if you've ever had a stomach ulcer or gastrointestinal bleeding, or are taking blood-thinner medicines.    If you are given phenazopydridine to reduce burning with urination, it will cause your urine to become a bright orange color. This can stain clothing.  Care and prevention  These self-care steps can help prevent future infections:    Drink plenty of fluids to prevent dehydration and flush out your bladder. Do this unless you must restrict fluids for other health reasons, or your doctor told you not to.    Proper cleaning after going to the bathroom is important. Wipe from front to back after using the toilet to prevent the spread of bacteria.    Urinate more often. Don't try to hold urine in for a long time.    Wear loose-fitting clothes and cotton underwear. Avoid tight-fitting pants.    Improve your diet and prevent constipation. Eat more fresh fruit and vegetables, and fiber, and less junk and fatty foods.    Avoid sex until your symptoms are gone.    Avoid caffeine, alcohol, and  spicy foods. These can irritate your bladder.    Urinate right after intercourse to flush out your bladder.    If you use birth control pills and have frequent bladder infections, discuss it with your doctor.  Follow-up care  Call your healthcare provider if all symptoms are not gone after 3 days of treatment. This is especially important if you have repeat infections.  If a culture was done, you will be told if your treatment needs to be changed. If directed, you can call to find out the results.  If X-rays were done, you will be told if the results will affect your treatment.  Call 911  Call 911 if any of the following occur:    Trouble breathing    Hard to wake up or confusion    Fainting or loss of consciousness    Rapid heart rate  When to seek medical advice  Call your healthcare provider right away if any of these occur:    Fever of 100.4 F (38.0 C) or higher, or as directed by your healthcare provider    Symptoms are not better by the third day of treatment    Back or belly (abdominal) pain that gets worse    Repeated vomiting, or unable to keep medicine down    Weakness or dizziness    Vaginal discharge    Pain, redness, or swelling in the outer vaginal area (labia)  Date Last Reviewed: 10/1/2016    3023-1416 [employer] [address] [place] [address], [place] [address]. All rights reserved. This information is not intended as a substitute for professional medical care. Always follow your healthcare professional's instructions.

## 2019-09-18 NOTE — PROGRESS NOTES
"Subjective     Graeme Johnson is a 54 year old female who presents to clinic today for the following health issues:    HPI   URINARY TRACT SYMPTOMS  Onset: x1 day     Description:   Painful urination (Dysuria): YES- burning            Frequency: YES  Blood in urine (Hematuria): no   Delay in urine (Hesitency): YES    Intensity: 5-6/10- last night    Progression of Symptoms:  worsening    Accompanying Signs & Symptoms:  Fever/chills: YES- achy this morning possibly febrile but did not check  Flank pain no   Nausea and vomiting: no   Any vaginal symptoms: none  Abdominal/Pelvic Pain: YES- suprapubic fullness, now resolved    History:   History of frequent UTI's: no   History of kidney stones: no   Sexually Active: YES  Possibility of pregnancy: No    Precipitating factors:   Urinating     Therapies Tried and outcome: increased fluids help, ibuprofen for suprapubic pain    No hx of recurrent UTIs.      Reviewed and updated as needed this visit by Provider  Meds  Problems         Review of Systems   Otherwise ROS is negative except as stated above.        Objective    BP (P) 90/58 (BP Location: Right arm, Patient Position: Chair, Cuff Size: Adult Regular)   Pulse (P) 55   Temp (P) 97.4  F (36.3  C) (Tympanic)   Ht (P) 1.727 m (5' 8\")   Wt (P) 65.8 kg (145 lb)   SpO2 (P) 97%   BMI (P) 22.05 kg/m    Body mass index is 22.05 kg/m  (pended).  Physical Exam   GENERAL: healthy, alert and no distress  RESP: lungs clear to auscultation - no rales, rhonchi or wheezes  CV: regular rate and rhythm, normal S1 S2, no S3 or S4, no murmur, click or rub  ABDOMEN: mild suprapubic tenderness  BACK: no CVA tenderness    Diagnostic Test Results:  Labs reviewed in Epic  Results for orders placed or performed in visit on 09/18/19 (from the past 24 hour(s))   UA with Microscopic reflex to Culture   Result Value Ref Range    Color Urine Yellow     Appearance Urine Clear     Glucose Urine Negative NEG^Negative mg/dL    Bilirubin Urine " HERNAN to call clinic nurse.  KAYLEIGH Elizabeth RN     "Negative NEG^Negative    Ketones Urine Negative NEG^Negative mg/dL    Specific Gravity Urine 1.010 1.003 - 1.035    pH Urine 5.5 5.0 - 7.0 pH    Protein Albumin Urine Negative NEG^Negative mg/dL    Urobilinogen Urine 0.2 0.2 - 1.0 EU/dL    Nitrite Urine Negative NEG^Negative    Blood Urine Large (A) NEG^Negative    Leukocyte Esterase Urine Moderate (A) NEG^Negative    Source Midstream Urine     WBC Urine 25-50 (A) OTO5^0 - 5 /HPF    RBC Urine  (A) OTO2^O - 2 /HPF    Squamous Epithelial /LPF Urine Few FEW^Few /LPF    Bacteria Urine Moderate (A) NEG^Negative /HPF           Assessment & Plan       ICD-10-CM    1. Acute cystitis with hematuria N30.01 nitroFURantoin macrocrystal-monohydrate (MACROBID) 100 MG capsule   2. Painful urination R30.9 UA with Microscopic reflex to Culture     Urine Culture Aerobic Bacterial   3. Nonspecific finding on examination of urine R82.90 Urine Culture Aerobic Bacterial     Begin antibiotics, urine culture pending. Push fluids.    Patient Instructions   Patient Education     Bladder Infection, Female (Adult)    Urine is normally doesn't have any bacteria in it. But bacteria can get into the urinary tract from the skin around the rectum. Or they can travel in the blood from elsewhere in the body. Once they are in your urinary tract, they can cause infection in the urethra (urethritis), the bladder (cystitis), or the kidneys (pyelonephritis).  The most common place for an infection is in the bladder. This is called a bladder infection. This is one of the most common infections in women. Most bladder infections are easily treated. They are not serious unless the infection spreads to the kidney.  The phrases \"bladder infection,\" \"UTI,\" and \"cystitis\" are often used to describe the same thing. But they are not always the same. Cystitis is an inflammation of the bladder. The most common cause of cystitis is an infection.  Symptoms  The infection causes inflammation in the urethra and " bladder. This causes many of the symptoms. The most common symptoms of a bladder infection are:    Pain or burning when urinating    Having to urinate more often than usual    Urgent need to urinate    Only a small amount of urine comes out    Blood in urine    Abdominal discomfort. This is usually in the lower abdomen above the pubic bone.    Cloudy urine    Strong- or bad-smelling urine    Unable to urinate (urinary retention)    Unable to hold urine in (urinary incontinence)    Fever    Loss of appetite    Confusion (in older adults)  Causes  Bladder infections are not contagious. You can't get one from someone else, from a toilet seat, or from sharing a bath.  The most common cause of bladder infections is bacteria from the bowels. The bacteria get onto the skin around the opening of the urethra. From there, they can get into the urine and travel up to the bladder, causing inflammation and infection. This usually happens because of:    Wiping improperly after urinating. Always wipe from front to back.    Bowel incontinence    Pregnancy    Procedures such as having a catheter inserted    Older age    Not emptying your bladder. This can allow bacteria a chance to grow in your urine.    Dehydration    Constipation    Sex    Use of a diaphragm for birth control   Treatment  Bladder infections are diagnosed by a urine test. They are treated with antibiotics and usually clear up quickly without complications. Treatment helps prevent a more serious kidney infection.  Medicines  Medicines can help in the treatment of a bladder infection:    Take antibiotics until they are used up, even if you feel better. It is important to finish them to make sure the infection has cleared.    You can use acetaminophen or ibuprofen for pain, fever, or discomfort, unless another medicine was prescribed. If you have chronic liver or kidney disease, talk with your healthcare provider before using these medicines. Also talk with your  provider if you've ever had a stomach ulcer or gastrointestinal bleeding, or are taking blood-thinner medicines.    If you are given phenazopydridine to reduce burning with urination, it will cause your urine to become a bright orange color. This can stain clothing.  Care and prevention  These self-care steps can help prevent future infections:    Drink plenty of fluids to prevent dehydration and flush out your bladder. Do this unless you must restrict fluids for other health reasons, or your doctor told you not to.    Proper cleaning after going to the bathroom is important. Wipe from front to back after using the toilet to prevent the spread of bacteria.    Urinate more often. Don't try to hold urine in for a long time.    Wear loose-fitting clothes and cotton underwear. Avoid tight-fitting pants.    Improve your diet and prevent constipation. Eat more fresh fruit and vegetables, and fiber, and less junk and fatty foods.    Avoid sex until your symptoms are gone.    Avoid caffeine, alcohol, and spicy foods. These can irritate your bladder.    Urinate right after intercourse to flush out your bladder.    If you use birth control pills and have frequent bladder infections, discuss it with your doctor.  Follow-up care  Call your healthcare provider if all symptoms are not gone after 3 days of treatment. This is especially important if you have repeat infections.  If a culture was done, you will be told if your treatment needs to be changed. If directed, you can call to find out the results.  If X-rays were done, you will be told if the results will affect your treatment.  Call 911  Call 911 if any of the following occur:    Trouble breathing    Hard to wake up or confusion    Fainting or loss of consciousness    Rapid heart rate  When to seek medical advice  Call your healthcare provider right away if any of these occur:    Fever of 100.4 F (38.0 C) or higher, or as directed by your healthcare provider    Symptoms  are not better by the third day of treatment    Back or belly (abdominal) pain that gets worse    Repeated vomiting, or unable to keep medicine down    Weakness or dizziness    Vaginal discharge    Pain, redness, or swelling in the outer vaginal area (labia)  Date Last Reviewed: 10/1/2016    4217-9932 [employer]. [address] [place] [address]. All rights reserved. This information is not intended as a substitute for professional medical care. Always follow your healthcare professional's instructions.               Return in about 2 days (around 9/20/2019) for Follow-up if symptoms do not improve or worsen.    Mary Foreman NP  [facility]

## 2019-09-20 LAB
BACTERIA SPEC CULT: ABNORMAL
SPECIMEN SOURCE: ABNORMAL

## 2019-10-01 ENCOUNTER — HEALTH MAINTENANCE LETTER (OUTPATIENT)
Age: 54
End: 2019-10-01

## 2019-12-10 ASSESSMENT — ENCOUNTER SYMPTOMS
CHILLS: 0
NERVOUS/ANXIOUS: 0
COUGH: 0
PARESTHESIAS: 0
ARTHRALGIAS: 1
FEVER: 0
DIARRHEA: 0
ABDOMINAL PAIN: 0
JOINT SWELLING: 0
EYE PAIN: 0
HEADACHES: 0
SORE THROAT: 0
DIZZINESS: 0
DYSURIA: 0
MYALGIAS: 1
PALPITATIONS: 0
NAUSEA: 0
SHORTNESS OF BREATH: 0
FREQUENCY: 0
HEARTBURN: 0
BREAST MASS: 0
HEMATOCHEZIA: 0
HEMATURIA: 0
WEAKNESS: 0
CONSTIPATION: 0

## 2019-12-12 ENCOUNTER — OFFICE VISIT (OUTPATIENT)
Dept: FAMILY MEDICINE | Facility: CLINIC | Age: 54
End: 2019-12-12
Payer: COMMERCIAL

## 2019-12-12 ENCOUNTER — TELEPHONE (OUTPATIENT)
Dept: OTHER | Facility: CLINIC | Age: 54
End: 2019-12-12

## 2019-12-12 VITALS
HEART RATE: 51 BPM | SYSTOLIC BLOOD PRESSURE: 100 MMHG | BODY MASS INDEX: 21.98 KG/M2 | HEIGHT: 68 IN | TEMPERATURE: 97.8 F | DIASTOLIC BLOOD PRESSURE: 52 MMHG | RESPIRATION RATE: 16 BRPM | WEIGHT: 145 LBS | OXYGEN SATURATION: 99 %

## 2019-12-12 DIAGNOSIS — Z11.4 SCREENING FOR HUMAN IMMUNODEFICIENCY VIRUS WITHOUT PRESENCE OF RISK FACTORS: ICD-10-CM

## 2019-12-12 DIAGNOSIS — Z13.6 CARDIOVASCULAR SCREENING; LDL GOAL LESS THAN 160: ICD-10-CM

## 2019-12-12 DIAGNOSIS — Z00.00 ROUTINE GENERAL MEDICAL EXAMINATION AT A HEALTH CARE FACILITY: Primary | ICD-10-CM

## 2019-12-12 DIAGNOSIS — I83.891 SYMPTOMATIC VARICOSE VEINS, RIGHT: ICD-10-CM

## 2019-12-12 DIAGNOSIS — M25.551 HIP PAIN, RIGHT: ICD-10-CM

## 2019-12-12 DIAGNOSIS — K62.82 AIN GRADE II: ICD-10-CM

## 2019-12-12 DIAGNOSIS — Z12.4 SCREENING FOR CERVICAL CANCER: ICD-10-CM

## 2019-12-12 DIAGNOSIS — Z13.1 SCREENING FOR DIABETES MELLITUS: ICD-10-CM

## 2019-12-12 DIAGNOSIS — M54.50 CHRONIC RIGHT-SIDED LOW BACK PAIN, UNSPECIFIED WHETHER SCIATICA PRESENT: ICD-10-CM

## 2019-12-12 DIAGNOSIS — G89.29 CHRONIC RIGHT-SIDED LOW BACK PAIN, UNSPECIFIED WHETHER SCIATICA PRESENT: ICD-10-CM

## 2019-12-12 PROCEDURE — 99396 PREV VISIT EST AGE 40-64: CPT | Performed by: FAMILY MEDICINE

## 2019-12-12 PROCEDURE — 99213 OFFICE O/P EST LOW 20 MIN: CPT | Mod: 25 | Performed by: FAMILY MEDICINE

## 2019-12-12 PROCEDURE — G0145 SCR C/V CYTO,THINLAYER,RESCR: HCPCS | Performed by: FAMILY MEDICINE

## 2019-12-12 PROCEDURE — 87624 HPV HI-RISK TYP POOLED RSLT: CPT | Performed by: FAMILY MEDICINE

## 2019-12-12 ASSESSMENT — ENCOUNTER SYMPTOMS
MYALGIAS: 1
DIARRHEA: 0
CHILLS: 0
FEVER: 0
JOINT SWELLING: 0
HEMATURIA: 0
WEAKNESS: 0
NAUSEA: 0
DIZZINESS: 0
BREAST MASS: 0
ARTHRALGIAS: 1
PARESTHESIAS: 0
EYE PAIN: 0
DYSURIA: 0
HEADACHES: 0
CONSTIPATION: 0
NERVOUS/ANXIOUS: 0
HEMATOCHEZIA: 0
FREQUENCY: 0
PALPITATIONS: 0
ABDOMINAL PAIN: 0
SORE THROAT: 0
HEARTBURN: 0
SHORTNESS OF BREATH: 0
COUGH: 0

## 2019-12-12 ASSESSMENT — MIFFLIN-ST. JEOR: SCORE: 1306.22

## 2019-12-12 NOTE — TELEPHONE ENCOUNTER
"Received referral via \"workqueue\", per  guidelines referral forwarded to [facility] for symptomatic varicose veins.    Sandra Parra, BSN, RN    "

## 2019-12-12 NOTE — PROGRESS NOTES
SUBJECTIVE:   CC: Graeme Johnson is an 54 year old woman who presents for preventive health visit.     Healthy Habits:     Getting at least 3 servings of Calcium per day:  Yes    Bi-annual eye exam:  Yes    Dental care twice a year:  Yes    Sleep apnea or symptoms of sleep apnea:  Daytime drowsiness    Diet:  Regular (no restrictions)    Frequency of exercise:  4-5 days/week    Duration of exercise:  30-45 minutes    Taking medications regularly:  Yes    Medication side effects:  None    PHQ-2 Total Score: 0              Today's PHQ-2 Score:   PHQ-2 (  [employer]) 12/10/2019   Q1: Little interest or pleasure in doing things 0   Q2: Feeling down, depressed or hopeless 0   PHQ-2 Score 0   Q1: Little interest or pleasure in doing things Not at all   Q2: Feeling down, depressed or hopeless Not at all   PHQ-2 Score 0       Abuse: Current or Past(Physical, Sexual or Emotional)- No  Do you feel safe in your environment? Yes        Social History     Tobacco Use     Smoking status: Former Smoker     Last attempt to quit: 1979     Years since quittin.9     Smokeless tobacco: Never Used     Tobacco comment: smoked a little as a teenager   Substance Use Topics     Alcohol use: Yes     Alcohol/week: 1.7 - 2.5 standard drinks     Types: 2 - 3 Standard drinks or equivalent per week     Comment: 1 glass per week         Alcohol Use 12/10/2019   Prescreen: >3 drinks/day or >7 drinks/week? No   Prescreen: >3 drinks/day or >7 drinks/week? -       Reviewed orders with patient.  Reviewed health maintenance and updated orders accordingly - Yes          Pertinent mammograms are reviewed under the imaging tab.  History of abnormal Pap smear: NO - but see colorectal surgery note for AIN; they did recommend cervical pap.    PAP / HPV Latest Ref Rng & Units 2017   PAP - NIL NIL NIL   HPV 16 DNA NEG Negative - -   HPV 18 DNA NEG Negative - -   OTHER HR HPV NEG Negative - -     Reviewed and updated as  needed this visit by clinical staff         Reviewed and updated as needed this visit by Provider        Patient Active Problem List   Diagnosis     CARDIOVASCULAR SCREENING; LDL GOAL LESS THAN 160     Allergic state     Left hip pain     Left shoulder pain     Pain in joint, pelvic region and thigh     Ischial bursitis     Acetabular labrum tear     Primary osteoarthritis of left hip     AIN grade II       Past Medical History:   Diagnosis Date     AIN grade II      Allergy        Past Surgical History:   Procedure Laterality Date     APPENDECTOMY       BIOPSY BREAST       C NONSPECIFIC PROCEDURE      PE tubes     C NONSPECIFIC PROCEDURE      [place] teeth     COLONOSCOPY N/A 2015    Procedure: COMBINED COLONOSCOPY, SINGLE OR MULTIPLE BIOPSY/POLYPECTOMY BY BIOPSY;  Surgeon: Gonzalez Emmanuel MD, MD;  Location: [facility]     ENT SURGERY      tonsilectomy 1969     HERNIORRHAPHY EPIGASTRIC N/A 2017    Procedure: HERNIORRHAPHY EPIGASTRIC;  open repair epigastric and umbilical hernia with mesh    ;  Surgeon: Luanne Damian MD;  Location:  OR     SURGICAL HISTORY OF -       venous ablation right leg     SURGICAL HISTORY OF -       excision of AIN 2 (intraanal)       OB History    Para Term  AB Living   4 3 3 0 1 3   SAB TAB Ectopic Multiple Live Births   0 1 0 0 0      # Outcome Date GA Lbr G[place]/2nd Weight Sex Delivery Anes PTL Lv   4 TAB            3 Term            2 Term            1 Term                Current Outpatient Medications   Medication Sig Dispense Refill     Cetirizine HCl (ZYRTEC ALLERGY PO) Take  by mouth. 1 tablet daily as needed       fluticasone (FLONASE) 50 MCG/ACT spray USE TWO SPRAY(S) IN EACH NOSTRIL ONCE DAILY 1 Bottle 11     ibuprofen (ADVIL/MOTRIN) 600 MG tablet Take 1 tablet (600 mg) by mouth every 6 hours as needed for pain (mild) 30 tablet 0     NEW MED Calcium supplement.  Unsure of dose       omega 3 1000 MG CAPS Take 1 g by mouth daily 90 capsule 0        Family History   Problem Relation Age of Onset     Alcohol/Drug Father      Cancer Father         skin     Hypertension Father      Lipids Father      Neurologic Disorder Father         TIA's; dementia     Respiratory Father         copd     Heart Disease Father         CHF; a. fib/brain hemorrhage     Hypertension Maternal Grandmother      Heart Disease Maternal Grandmother         brain hemorrhage     Diabetes Maternal Grandfather      C.A.D. Maternal Grandfather      Cancer Maternal Grandfather         Bladder     Psychotic Disorder Sister         depression       Social History     Tobacco Use     Smoking status: Former Smoker     Last attempt to quit: 1979     Years since quittin.9     Smokeless tobacco: Never Used     Tobacco comment: smoked a little as a teenager   Substance Use Topics     Alcohol use: Yes     Alcohol/week: 1.7 - 2.5 standard drinks     Types: 2 - 3 Standard drinks or equivalent per week     Comment: 1 glass per week       Immunization History   Administered Date(s) Administered     Influenza Vaccine IM > 6 months Valent IIV4 2017     TD (ADULT, 7+) 1998     TDAP Vaccine (Adacel) 2010       Review of Systems   Constitutional: Negative for chills and fever.   HENT: Negative for congestion, ear pain, hearing loss and sore throat.    Eyes: Negative for pain and visual disturbance.   Respiratory: Negative for cough and shortness of breath.    Cardiovascular: Negative for chest pain, palpitations and peripheral edema.   Gastrointestinal: Negative for abdominal pain, constipation, diarrhea, heartburn, hematochezia and nausea.   Breasts:  Negative for tenderness, breast mass and discharge.   Genitourinary: Negative for dysuria, frequency, genital sores, hematuria, pelvic pain, urgency, vaginal bleeding and vaginal discharge.   Musculoskeletal: Positive for arthralgias and myalgias. Negative for joint swelling.   Skin: Negative for rash.   Neurological: Negative for  "dizziness, weakness, headaches and paresthesias.   Psychiatric/Behavioral: Negative for mood changes. The patient is not nervous/anxious.      Currently seeing Colorectal yearly.   The do send reminders.    Veins are bothering her again. Hurt when sleeping. Worse than previously.   Any pressure causes some pain.     Sees Chiropractor regularly; usually for her upper back.  SI area irritated. Some pain through hip; mostly on right; some on left.   Some of this seems similar to when had torn labrum, but also with piriformis issue that she had years ago. Now on the right.   Occasional shooting pain down right thigh.     LMP July; prior to that March      OBJECTIVE:   /52 (BP Location: Right arm, Cuff Size: Adult Regular)   Pulse 51   Temp 97.8  F (36.6  C) (Oral)   Resp 16   Ht 1.727 m (5' 8\")   Wt 65.8 kg (145 lb)   SpO2 99%   BMI 22.05 kg/m    Physical Exam  GENERAL APPEARANCE: healthy, alert and no distress  EYES: Eyes grossly normal to inspection, PERRL and conjunctivae and sclerae normal  HENT: ear canals and TM's normal, nose and mouth without ulcers or lesions, oropharynx clear and oral mucous membranes moist  NECK: no adenopathy, no asymmetry, masses, or scars and thyroid normal to palpation  RESP: lungs clear to auscultation - no rales, rhonchi or wheezes  BREAST: normal without masses, tenderness or nipple discharge and no palpable axillary masses or adenopathy  CV: regular rates and rhythm, no peripheral edema and peripheral pulses strong  ABDOMEN: soft, nontender, no hepatosplenomegaly, no masses and bowel sounds normal   (female): normal female external genitalia, normal urethral meatus, vaginal mucosal atrophy noted, normal cervix, adnexae, and uterus without masses or abnormal discharge  MS: no musculoskeletal defects are noted and gait is age appropriate without ataxia  SKIN: no suspicious lesions or rashes  NEURO: Normal strength and tone, sensory exam grossly normal, mentation intact " "and speech normal  PSYCH: mentation appears normal and affect normal/bright      Recent Labs   Lab Test 12/24/14  0951   CHOL 184   HDL 98   LDL 74   TRIG 61   CHOLHDLRATIO 1.9       Reviewed Colorectal note.     ASSESSMENT/PLAN:   1. Routine general medical examination at a health care facility      2. Symptomatic varicose veins, right  Reviewed some of the conservative measures. She has been to Vascular in the past and recalls they did say she might have problems in the future. Will refer at this time.   - VASCULAR SURGERY REFERRAL; Future    3. Hip pain, right  Uncertain etiology.  She notes similar to when she had labral tear, but also piriformis issues.   Have referred to [facility] to help figure out and advise.  - ORTHO  REFERRAL    4. Chronic right-sided low back pain, unspecified whether sciatica present  As above.   - ORTHO  REFERRAL    5. AIN grade II  She is continuing with Colorectal surgery surveillance. They have recommended cervical pap.     6. Screening for cervical cancer  As above.   May need to see if Colorectal has a recommendation for how frequent to get cervical paps.   - Pap imaged thin layer screen with HPV - recommended age 30 - 65 years (select HPV order below)  - HPV High Risk Types DNA Cervical    7. CARDIOVASCULAR SCREENING; LDL GOAL LESS THAN 160    - Lipid panel reflex to direct LDL Fasting; Future    8. Screening for human immunodeficiency virus without presence of risk factors    - HIV Antigen Antibody Combo; Future    9. Screening for diabetes mellitus    - Glucose; Future    Will check at pharmacy about flu and shingrix.    COUNSELING:  Reviewed preventive health counseling, as reflected in patient instructions       Regular exercise       Healthy diet/nutrition       Vision screening    Estimated body mass index is 22.05 kg/m  as calculated from the following:    Height as of this encounter: 1.727 m (5' 8\").    Weight as of this encounter: 65.8 kg (145 lb).         " reports that she quit smoking about 40 years ago. She has never used smokeless tobacco.      Counseling Resources:  ATP IV Guidelines  Pooled Cohorts Equation Calculator  Breast Cancer Risk Calculator  FRAX Risk Assessment  ICSI Preventive Guidelines  Dietary Guidelines for Americans, 2010  USDA's MyPlate  ASA Prophylaxis  Lung CA Screening    Nessa Contreras MD, MD  [facility]

## 2019-12-17 LAB
COPATH REPORT: NORMAL
PAP: NORMAL

## 2019-12-18 LAB
FINAL DIAGNOSIS: NORMAL
HPV HR 12 DNA CVX QL NAA+PROBE: NEGATIVE
HPV16 DNA SPEC QL NAA+PROBE: NEGATIVE
HPV18 DNA SPEC QL NAA+PROBE: NEGATIVE
SPECIMEN DESCRIPTION: NORMAL
SPECIMEN SOURCE CVX/VAG CYTO: NORMAL

## 2019-12-23 ENCOUNTER — OFFICE VISIT (OUTPATIENT)
Dept: ORTHOPEDICS | Facility: CLINIC | Age: 54
End: 2019-12-23
Payer: COMMERCIAL

## 2019-12-23 ENCOUNTER — ANCILLARY PROCEDURE (OUTPATIENT)
Dept: GENERAL RADIOLOGY | Facility: CLINIC | Age: 54
End: 2019-12-23
Attending: FAMILY MEDICINE
Payer: COMMERCIAL

## 2019-12-23 VITALS
DIASTOLIC BLOOD PRESSURE: 78 MMHG | HEIGHT: 68 IN | BODY MASS INDEX: 21.98 KG/M2 | WEIGHT: 145 LBS | SYSTOLIC BLOOD PRESSURE: 118 MMHG

## 2019-12-23 DIAGNOSIS — M67.952 TENDINOPATHY OF LEFT GLUTEUS MEDIUS: ICD-10-CM

## 2019-12-23 DIAGNOSIS — M25.551 RIGHT HIP PAIN: Primary | ICD-10-CM

## 2019-12-23 DIAGNOSIS — M67.951 TENDINOPATHY OF RIGHT GLUTEUS MEDIUS: ICD-10-CM

## 2019-12-23 DIAGNOSIS — M25.551 RIGHT HIP PAIN: ICD-10-CM

## 2019-12-23 PROCEDURE — 73502 X-RAY EXAM HIP UNI 2-3 VIEWS: CPT

## 2019-12-23 PROCEDURE — 99204 OFFICE O/P NEW MOD 45 MIN: CPT | Performed by: FAMILY MEDICINE

## 2019-12-23 ASSESSMENT — MIFFLIN-ST. JEOR: SCORE: 1306.22

## 2019-12-23 NOTE — PROGRESS NOTES
"ASSESSMENT & PLAN    1. Right hip pain    2. Tendinopathy of right gluteus medius    3. Tendinopathy of left gluteus medius      Seen in consultation for subacute right thigh/hip and right buttock pain.  Pain appears more related to hip strength/stability. Right hip/thigh could be related to labral degeneration which presents intermittently.  Reviewed xray - no hip arthritis. Mild/moderate arthritis at your pubic symphysis  Continue to stay active  Ok to use as needed Ibuprofen  Referral to physical therapy - recommend [facility] in [place]    -----    SUBJECTIVE  Graeme Johnson is a/an 54 year old female who is seen in consultation at the request of  Nessa Contreras M.D. for evaluation of hip/thigh and right buttock pain. The patient is seen by themselves.    Onset: 3-4 month(s) ago. Reports insidious onset without acute precipitating event. Patient does note that she had been playing pickle ball regularly for about 2 months before her pain began. She believes this may have aggravated the pain. She reports pain comes and goes.  Location of Pain: right groin and anterior right hip, right sided low back  Rating of Pain at worst: 4/10  Rating of Pain Currently: 1/10  Worsened by: back: slipping on the ice and twisting or \"jerking\" back (ie. Trunk extension or flexion), prolonged sitting, shoveling; right hip: heaving lifting, hip abduction, shoveling  Better with: ibuprofen, ice  Treatments tried: rest/activity avoidance, ice and ibuprofen  Quality: aching, dull, occasional shooting pain in right anterior thigh  Red flags: Weakness: No, bowel/bladder loss: No, foot drop: No  Associated symptoms: locking/catching in right sided low back  Orthopedic history: YES - h/o left hip acetabular labrum tear Date: 2013/2014  Relevant surgical history: NO  Patient Social History: works at a clerical position    Patient's past medical, surgical, social, and family histories were reviewed today and no pertinent history related to " "patient's presenting problem.    REVIEW OF SYSTEMS:  10 point ROS is negative other than symptoms noted above in HPI, Past Medical History or as stated below  Constitutional: NEGATIVE for fever, chills, change in weight  Skin: NEGATIVE for worrisome rashes, moles or lesions  GI/: NEGATIVE for bowel or bladder changes  Neuro: NEGATIVE for weakness, dizziness or paresthesias    OBJECTIVE:  /78   Ht 1.727 m (5' 8\")   Wt 65.8 kg (145 lb)   BMI 22.05 kg/m     General: healthy, alert and in no distress  HEENT: no scleral icterus or conjunctival erythema  Skin: no suspicious lesions or rash. No jaundice.  CV: no pedal edema  Resp: normal respiratory effort without conversational dyspnea   Psych: normal mood and affect  Gait: normal steady gait with appropriate coordination and balance  Neuro: normal light touch sensory exam of the bilateral lower extremities.    MSK:  THORACIC/LUMBAR SPINE  Inspection:    No gross deformity/asymmetry  Palpation:    Tender about the left sciatic notch, right sciatic notch and b/l gluteus medius insertion/tendon. Otherwise remainder of landmarks are nontender.  Range of Motion:     Lumbar flexion full    Lumbar extension full  Strength:    able to heel walk, able to toe walk, trendelenburg stance  Special Tests:    Negative: slump test (bilateral), SI joint compression (bilateral), FADIR (bilateral)    Independent visualization of the below image:  XR Pelvis, Right Hip  No significant degenerative changes in right hip joint. Narrowing/sclerosis at pubic symphysis consistent with mild/moderate arthritis.  No overt sings of hip impingement.    MRI Left Hip: 2/2014 - MARGARET Bettencourt DO [facility]    "

## 2019-12-23 NOTE — LETTER
"    12/23/2019         RE: Graeme Johnson  [address] [facility] [address]        Dear Colleague,    Thank you [facility] Johnson[facility] Please see a copy of my visit note below.    ASSESSMENT & PLAN    1. Right hip pain    2. Tendinopathy of right gluteus medius    3. Tendinopathy of left gluteus medius      Seen in consultation for subacute right thigh/hip and right buttock pain.  Pain appears more related to hip strength/stability. Right hip/thigh could be related to labral degeneration which presents intermittently.  Reviewed xray - no hip arthritis. Mild/moderate arthritis at your pubic symphysis  Continue to stay active  Ok to use as needed Ibuprofen  Referral to physical therapy - recommend Sandra in [place]    -----    SUBJECTIVE  Graeme Johnson is a/an 54 year old female who is seen in consultation at the request of  Nessa Contreras M.D. for evaluation of hip/thigh and right buttock pain. The patient is seen by themselves.    Onset: 3-4 month(s) ago. Reports insidious onset without acute precipitating event. Patient does note that she had been playing pickle ball regularly for about 2 months before her pain began. She believes this may have aggravated the pain. She reports pain comes and goes.  Location of Pain: right groin and anterior right hip, right sided low back  Rating of Pain at worst: 4/10  Rating of Pain Currently: 1/10  Worsened by: back: slipping on the ice and twisting or \"jerking\" back (ie. Trunk extension or flexion), prolonged sitting, shoveling; right hip: heaving lifting, hip abduction, shoveling  Better with: ibuprofen, ice  Treatments tried: rest/activity avoidance, ice and ibuprofen  Quality: aching, dull, occasional shooting pain in right anterior thigh  Red flags: Weakness: No, bowel/bladder loss: No, foot drop: No  Associated symptoms: locking/catching in right sided low back  Orthopedic history: YES - " "h/o left hip acetabular labrum tear Date: 2013/2014  Relevant surgical history: NO  Patient Social History: works at a clerical position    Patient's past medical, surgical, social, and family histories were reviewed today and no pertinent history related to patient's presenting problem.    REVIEW OF SYSTEMS:  10 point ROS is negative other than symptoms noted above in HPI, Past Medical History or as stated below  Constitutional: NEGATIVE for fever, chills, change in weight  Skin: NEGATIVE for worrisome rashes, moles or lesions  GI/: NEGATIVE for bowel or bladder changes  Neuro: NEGATIVE for weakness, dizziness or paresthesias    OBJECTIVE:  /78   Ht 1.727 m (5' 8\")   Wt 65.8 kg (145 lb)   BMI 22.05 kg/m      General: healthy, alert and in no distress  HEENT: no scleral icterus or conjunctival erythema  Skin: no suspicious lesions or rash. No jaundice.  CV: no pedal edema  Resp: normal respiratory effort without conversational dyspnea   Psych: normal mood and affect  Gait: normal steady gait with appropriate coordination and balance  Neuro: normal light touch sensory exam of the bilateral lower extremities.    MSK:  THORACIC/LUMBAR SPINE  Inspection:    No gross deformity/asymmetry  Palpation:    Tender about the left sciatic notch, right sciatic notch and b/l gluteus medius insertion/tendon. Otherwise remainder of landmarks are nontender.  Range of Motion:     Lumbar flexion full    Lumbar extension full  Strength:    able to heel walk, able to toe walk, trendelenburg stance  Special Tests:    Negative: slump test (bilateral), SI joint compression (bilateral), FADIR (bilateral)    Independent visualization of the below image:  XR Pelvis, Right Hip  No significant degenerative changes in right hip joint. Narrowing/sclerosis at pubic symphysis consistent with mild/moderate arthritis.  No overt sings of hip impingement.    MRI Left Hip: 2/2014 - [facility]    Tanner Bettencourt DO [facility]  [employer] [facility]      Again, thank you for allowing me to participate in the care of your patient.        Sincerely,        Tanner Bettencourt, DO

## 2019-12-23 NOTE — PATIENT INSTRUCTIONS
1. Right hip pain    2. Tendinopathy of right gluteus medius    3. Tendinopathy of left gluteus medius      Reviewed xray - no hip arthritis. Mild/moderate arthritis at your pubic symphysis  Continue to stay active  Pain appears more related to hip strength/stability  Ok to use as needed Ibuprofen  Referral to physical therapy - recommend Sandra martinez [place]

## 2020-01-13 ENCOUNTER — THERAPY VISIT (OUTPATIENT)
Dept: PHYSICAL THERAPY | Facility: CLINIC | Age: 55
End: 2020-01-13
Attending: FAMILY MEDICINE
Payer: COMMERCIAL

## 2020-01-13 DIAGNOSIS — M25.551 RIGHT HIP PAIN: ICD-10-CM

## 2020-01-13 DIAGNOSIS — M67.952 TENDINOPATHY OF LEFT GLUTEUS MEDIUS: ICD-10-CM

## 2020-01-13 DIAGNOSIS — M67.951 TENDINOPATHY OF RIGHT GLUTEUS MEDIUS: ICD-10-CM

## 2020-01-13 PROCEDURE — 97161 PT EVAL LOW COMPLEX 20 MIN: CPT | Mod: GP | Performed by: PHYSICAL THERAPIST

## 2020-01-13 PROCEDURE — 97110 THERAPEUTIC EXERCISES: CPT | Mod: GP | Performed by: PHYSICAL THERAPIST

## 2020-01-13 PROCEDURE — 97535 SELF CARE MNGMENT TRAINING: CPT | Mod: GP | Performed by: PHYSICAL THERAPIST

## 2020-01-13 ASSESSMENT — ACTIVITIES OF DAILY LIVING (ADL)
WALKING_UP_STEEP_HILLS: NO DIFFICULTY AT ALL
HEAVY_WORK: SLIGHT DIFFICULTY
ROLLING_OVER_IN_BED: NO DIFFICULTY AT ALL
WALKING_APPROXIMATELY_10_MINUTES: NO DIFFICULTY AT ALL
SITTING_FOR_15_MINUTES: MODERATE DIFFICULTY
STANDING_FOR_15_MINUTES: SLIGHT DIFFICULTY
TWISTING/PIVOTING_ON_INVOLVED_LEG: SLIGHT DIFFICULTY
GOING_DOWN_1_FLIGHT_OF_STAIRS: SLIGHT DIFFICULTY
WALKING_DOWN_STEEP_HILLS: NO DIFFICULTY AT ALL
RECREATIONAL_ACTIVITIES: SLIGHT DIFFICULTY
STEPPING_UP_AND_DOWN_CURBS: NO DIFFICULTY AT ALL
WALKING_15_MINUTES_OR_GREATER: SLIGHT DIFFICULTY
WALKING_INITIALLY: NO DIFFICULTY AT ALL
PUTTING_ON_SOCKS_AND_SHOES: NO DIFFICULTY AT ALL
LIGHT_TO_MODERATE_WORK: SLIGHT DIFFICULTY
HOW_WOULD_YOU_RATE_YOUR_CURRENT_LEVEL_OF_FUNCTION_DURING_YOUR_USUAL_ACTIVITIES_OF_DAILY_LIVING_FROM_0_TO_100_WITH_100_BEING_YOUR_LEVEL_OF_FUNCTION_PRIOR_TO_YOUR_HIP_PROBLEM_AND_0_BEING_THE_INABILITY_TO_PERFORM_ANY_OF_YOUR_USUAL_DAILY_ACTIVITIES?: 95
GOING_UP_1_FLIGHT_OF_STAIRS: SLIGHT DIFFICULTY
GETTING_INTO_AND_OUT_OF_A_BATHTUB: NO DIFFICULTY AT ALL
GETTING_INTO_AND_OUT_OF_AN_AVERAGE_CAR: NO DIFFICULTY AT ALL

## 2020-01-13 NOTE — PROGRESS NOTES
[facility] Initial Evaluation  Subjective:  Onset of R hip groin pain ~ Sept 2019, maybe after playing pickleball in summer. Sxs are settling down. Still worse with sitting. Also sometimes sxs in B central sacrum area. Hx of L hip labral tear and prior course of PT for this, managed well. Walks daily 2-3 miles, [employer] 3x week, does Circuit room. Goals to learn how to manage/avoid return of sxs. HX of abdominal hernia repairs ~ 2 years ago.     The history is provided by the patient. No  was used.   Graeme Johnson being seen for Right hip pain and sacroilliac pain.   Problem began 9/1/2019. Where condition occurred: for unknown reasons.Problem occurred: Occurs intermittently; seems to correlate with L/B exertion  General health as reported by patient is excellent. Pertinent medical history includes:  Migraines/headaches, osteoarthritis and other. Other medical history details: left hip labral tear and irritated piriformis.    Surgeries include:  Other. Other surgery history details: abdominal hernia repair, appendectomy, venous ablasion, tonsillectomy.   Other medications details: Ibuprofen.   Primary job tasks include:  Computer work and prolonged sitting.  Pain is described as aching, burning and sharp and is intermittent. Pain is worse during the day. Since onset symptoms are gradually improving. Special tests:  X-ray. Previous treatment includes chiropractic. There was none improvement following previous treatment.   Patient is Office assistan. Restrictions include:  Working in normal job without restrictions.    Barriers include:  None as reported by patient.  Red flags:  None as reported by patient.                      Objective:  Standing Alignment:            Hip deviations alignment: R hip retroverted in resting supine position vs L.                                                         Hip Evaluation  HIP AROM:  AROM:    Left Hip:     Normal    Right Hip:    Normal                  Hip PROM:  Hip PROM:  Left Hip:    Normal  Right Hip:  Normal                    Endfeel: Fair control with SL bridge  B      Hip Strength:    Flexion:   Left: 5/5   -  Pain:  Right: 4-/5   -  Pain:                    Extension:  Left: 5/5  -  Pain:Right: 4-/5    -  Pain:    Abduction:  Left: 5/5    -   Pain:Right: 3+/5   -   Pain:                  Hip Special Testing:   Special tests hip not assessed: R groin pain with FADIR, L very mild R groin pain with FADIR.  Left hip positive for the following special tests:  Fadir/Labrum  Left hip negative for the following special tests:  Lisy   Right hip positive for the following special tests:  Fadir/LabrumRight hip negative for the following special tests:  Lisy    Hip Palpation:  Normal                    General     ROS    Assessment/Plan:    Patient is a 54 year old female with right side hip complaints.    Patient has the following significant findings with corresponding treatment plan.                Diagnosis 1:  R hip pain  Pain -  hot/cold therapy, manual therapy, splint/taping/bracing/orthotics, self management, education, directional preference exercise and home program  Decreased ROM/flexibility - manual therapy and therapeutic exercise  Decreased strength - therapeutic exercise and therapeutic activities  Decreased proprioception - neuro re-education and therapeutic activities  Inflammation - self management/home program  Impaired gait - gait training  Impaired muscle performance - neuro re-education  Decreased function - therapeutic activities  Impaired posture - neuro re-education    Therapy Evaluation Codes:   1) History comprised of:   Personal factors that impact the plan of care:      None.    Comorbidity factors that impact the plan of care are:      None.     Medications impacting care: Anti-inflammatory.  2) Examination of Body Systems comprised of:   Body structures and functions that impact the plan of care:      Hip and  Sacral illiac joint.   Activity limitations that impact the plan of care are:      Running and Sitting.  3) Clinical presentation characteristics are:   Stable/Uncomplicated.  4) Decision-Making    Low complexity using standardized patient assessment instrument and/or measureable assessment of functional outcome.  Cumulative Therapy Evaluation is: Low complexity.    Previous and current functional limitations:  (See Goal Flow Sheet for this information)    Short term and Long term goals: (See Goal Flow Sheet for this information)     Communication ability:  Patient appears to be able to clearly communicate and understand verbal and written communication and follow directions correctly.  Treatment Explanation - The following has been discussed with the patient:   RX ordered/plan of care  Anticipated outcomes  Possible risks and side effects  This patient would benefit from PT intervention to resume normal activities.   Rehab potential is excellent.    Frequency:  2 X a month, once daily  Duration:  for 1 months  Discharge Plan:  Achieve all LTG.  Independent in home treatment program.  Reach maximal therapeutic benefit.    Please refer to the daily flowsheet for treatment today, total treatment time and time spent performing 1:1 timed codes.

## 2020-01-27 ENCOUNTER — THERAPY VISIT (OUTPATIENT)
Dept: PHYSICAL THERAPY | Facility: CLINIC | Age: 55
End: 2020-01-27
Payer: COMMERCIAL

## 2020-01-27 DIAGNOSIS — M25.551 RIGHT HIP PAIN: ICD-10-CM

## 2020-01-27 PROCEDURE — 97112 NEUROMUSCULAR REEDUCATION: CPT | Mod: GP | Performed by: PHYSICAL THERAPIST

## 2020-01-27 PROCEDURE — 97110 THERAPEUTIC EXERCISES: CPT | Mod: GP | Performed by: PHYSICAL THERAPIST

## 2020-02-05 ENCOUNTER — OFFICE VISIT (OUTPATIENT)
Dept: VASCULAR SURGERY | Facility: CLINIC | Age: 55
End: 2020-02-05
Payer: COMMERCIAL

## 2020-02-05 ENCOUNTER — ANCILLARY PROCEDURE (OUTPATIENT)
Dept: ULTRASOUND IMAGING | Facility: CLINIC | Age: 55
End: 2020-02-05
Attending: SURGERY
Payer: COMMERCIAL

## 2020-02-05 DIAGNOSIS — I83.891 SYMPTOMATIC VARICOSE VEINS, RIGHT: ICD-10-CM

## 2020-02-05 PROCEDURE — 99214 OFFICE O/P EST MOD 30 MIN: CPT | Performed by: SURGERY

## 2020-02-05 PROCEDURE — 93971 EXTREMITY STUDY: CPT | Mod: RT | Performed by: SURGERY

## 2020-02-05 NOTE — PROGRESS NOTES
[facility]    HPI:    Graeme Johnson is a pleasant 54 year old female referred by Dr. Nessa Contreras for evaluation of recurrent right lower extremity pain and varicose veins.  Ms. Johnson says that we performed a right lower extremity ablation about 10 years ago, records of which are not available at this time.  He did well until about 6 months ago when she developed right medial calf and right thigh aching toward the end of the day.  Over the last 3 months this has progressed to pain that has been awakening her from sleep at 3 AM on multiple occasions.  It causes her to have to get up and walk to relieve the pain.  She has noted no swelling, erythema or induration of her leg.  The pain in her right lower extremity is worse than what she suffered prior to her previous treatment.  She has had no trauma.  Her physical activities have not changed.    She has worn compression hose for more than 3 months and has not found significant relief of her discomfort with them.  She actually finds the compression hose quite uncomfortable, especially on her thigh.    She has no history of deep vein thrombosis, superficial thrombophlebitis or significant trauma.  She does feel that her symptoms are interfering with actives of daily living because it is awakening her from sleep on a regular basis.    She has pursued conservative measures including exercise, dietary discretion, leg elevation and compression but has had no relief of her symptoms.    PAST MEDICAL HISTORY:   Past Medical History:   Diagnosis Date     AIN grade II      Allergy        PAST SURGICAL HISTORY:   Past Surgical History:   Procedure Laterality Date     APPENDECTOMY       BIOPSY BREAST  2012     C NONSPECIFIC PROCEDURE      PE tubes     C NONSPECIFIC PROCEDURE      [place] teeth     COLONOSCOPY N/A 11/2/2015    Procedure: COMBINED COLONOSCOPY, SINGLE OR MULTIPLE BIOPSY/POLYPECTOMY BY BIOPSY;  Surgeon: Gonzalez mEmanuel MD, MD;  Location:  GI     ENT  SURGERY      tonsilectomy 1969     HERNIORRHAPHY EPIGASTRIC N/A 2017    Procedure: HERNIORRHAPHY EPIGASTRIC;  open repair epigastric and umbilical hernia with mesh    ;  Surgeon: Luanne Damian MD;  Location: [facility] OR     SURGICAL HISTORY OF -       venous ablation right leg     SURGICAL HISTORY OF -       excision of AIN 2 (intraanal)       FAMILY HISTORY:   Family History   Problem Relation Age of Onset     Alcohol/Drug Father      Cancer Father         skin     Hypertension Father      Lipids Father      Neurologic Disorder Father         TIA's; dementia     Respiratory Father         copd     Heart Disease Father         CHF; a. fib/brain hemorrhage     Hypertension Maternal Grandmother      Heart Disease Maternal Grandmother         brain hemorrhage     Diabetes Maternal Grandfather      C.A.D. Maternal Grandfather      Cancer Maternal Grandfather         Bladder     Psychotic Disorder Sister         depression       SOCIAL HISTORY:   Social History     Tobacco Use     Smoking status: Former Smoker     Last attempt to quit: 1979     Years since quittin.1     Smokeless tobacco: Never Used     Tobacco comment: smoked a little as a teenager   Substance Use Topics     Alcohol use: Yes     Alcohol/week: 1.7 - 2.5 standard drinks     Types: 2 - 3 Standard drinks or equivalent per week     Comment: 1 glass per week       REVIEW OF SYSTEMS: Review Of Systems  Skin: negative  Eyes: Wears corrective lenses  Ears/Nose/Throat: negative  Respiratory: No shortness of breath, dyspnea on exertion, cough, or hemoptysis  Cardiovascular: negative  Gastrointestinal: negative  Genitourinary: negative  Musculoskeletal: Arthralgias and myalgias.  No joint swelling  Neurologic: headaches  Psychiatric: negative  Hematologic/Lymphatic/Immunologic: negative  Endocrine: negative      Vital signs:  Breastfeeding No     Current Outpatient Medications   Medication Sig Dispense Refill     Cetirizine HCl (ZYRTEC  ALLERGY PO) Take  by mouth. 1 tablet daily as needed       fluticasone (FLONASE) 50 MCG/ACT spray USE TWO SPRAY(S) IN EACH NOSTRIL ONCE DAILY 1 Bottle 11     ibuprofen (ADVIL/MOTRIN) 600 MG tablet Take 1 tablet (600 mg) by mouth every 6 hours as needed for pain (mild) 30 tablet 0     NEW MED Calcium supplement.  Unsure of dose       omega 3 1000 MG CAPS Take 1 g by mouth daily 90 capsule 0     Allergies:  NKDA    PHYSICAL EXAM:  General: Pleasant, NAD.   HEENT: Normocephalic, atraumatic, external ears and nose normal.  Wears corrective lenses  Respiratory: Normal respiratory effort.   Cardiovascular: Pulse is regular.   Musculoskeletal: Gait and station normal.  The joints of her fingers and toes without deformity.  There is no cyanosis of her nailbeds.   EXTREMITIES: Right lower extremity: 4 mm varicosities with reticular veins about the right medial calf.  There is tenderness overlying this area but no induration or erythema is appreciated.  There is tenderness along the distal posterior medial right thigh but no visible varicose veins, induration, ecchymosis or erythema are appreciated.  There is no significant edema of her ankle or venous stasis changes.    PULSES: R/L (3=normal pulse, 0=no palpable pulse) dorsalis pedis: 3/3; posterior tibial: 3/3.      Neurologic: Grossly normal  Psychiatric: Mood, affect, judgment and insight are normal     Venous Duplex Ultrasound:   Right lower extremity: No evidence of deep vein thrombosis.  Her right common femoral through mid femoral veins are incompetent but the distal femoral and popliteal veins are competent.  The right great saphenous vein is not visualized from the saphenofemoral junction to the proximal calf, consistent with previous ablation.  The right below-knee great saphenous vein is patent and competent.  The right small saphenous vein and vein of Giacomini are competent.  The right anterior accessory saphenous vein is not visualized.  There are multiple  incompetent vein branches coursing from the below-knee great saphenous vein measuring up to 3.7 mm in diameter with reflux time of 4.8 seconds.    The left common femoral vein shows normal phasicity, compression augmentation with no evidence of deep vein thrombosis.    ASSESSMENT:  Recurrent right lower extremity pain and varicose veins.  There is no significant axial incompetence but she does have several incompetent vein branches coursing from the below-knee great saphenous vein to the area of her pain.    We discussed options of continued conservative management with use of compression hose, leg elevation, dietary measures, exercise and weight control.  She has pursued these measures for nearly a year.  Risk of conservative measures including superficial thrombophlebitis, bleeding and progression of the venous insufficiency were discussed.    If she chose treatment, she would be a candidate for medically necessary ultrasound-guided sclerotherapy of the incompetent, recurrent vein branches on her medial calf.  I would also image her distal medial right thigh to ensure that there were no vein branches that needed to be treated at this location.    Details of sclerotherapy including risks of deep vein thrombosis, allergic reaction, ulceration, hyperpigmentation and trapped blood were discussed.  The patient voiced understanding and her questions were answered.    PLAN:  The patient will think about her options and let us know how she would like to proceed.  Estimates were given.     Dio Crawford MD      [facility] NEW PATIENT:

## 2020-02-05 NOTE — LETTER
2/5/2020         RE: Graeme Johnson  [address] [facility] [address]        Dear Colleague,    Thank you for referring your patient, Graeme Johnson, to the [facility]. Please see a copy of my visit note below.    [facility]    HPI:    Graeme Johnson is a pleasant 54 year old female referred by Dr. Nessa Contreras for evaluation of recurrent right lower extremity pain and varicose veins.  Ms. Johnson says that we performed a right lower extremity ablation about 10 years ago, records of which are not available at this time.  He did well until about 6 months ago when she developed right medial calf and right thigh aching toward the end of the day.  Over the last 3 months this has progressed to pain that has been awakening her from sleep at 3 AM on multiple occasions.  It causes her to have to get up and walk to relieve the pain.  She has noted no swelling, erythema or induration of her leg.  The pain in her right lower extremity is worse than what she suffered prior to her previous treatment.  She has had no trauma.  Her physical activities have not changed.    She has worn compression hose for more than 3 months and has not found significant relief of her discomfort with them.  She actually finds the compression hose quite uncomfortable, especially on her thigh.    She has no history of deep vein thrombosis, superficial thrombophlebitis or significant trauma.  She does feel that her symptoms are interfering with actives of daily living because it is awakening her from sleep on a regular basis.    She has pursued conservative measures including exercise, dietary discretion, leg elevation and compression but has had no relief of her symptoms.    PAST MEDICAL HISTORY:   Past Medical History:   Diagnosis Date     AIN grade II      Allergy        PAST SURGICAL HISTORY:   Past Surgical History:   Procedure Laterality Date     APPENDECTOMY       BIOPSY BREAST        C NONSPECIFIC PROCEDURE      PE tubes     C NONSPECIFIC PROCEDURE      [place] teeth     COLONOSCOPY N/A 2015    Procedure: COMBINED COLONOSCOPY, SINGLE OR MULTIPLE BIOPSY/POLYPECTOMY BY BIOPSY;  Surgeon: Gonzalez Emmanuel MD, MD;  Location:  GI     ENT SURGERY      tonsilectomy 1969     HERNIORRHAPHY EPIGASTRIC N/A 2017    Procedure: HERNIORRHAPHY EPIGASTRIC;  open repair epigastric and umbilical hernia with mesh    ;  Surgeon: Luanne Damian MD;  Location:  OR     SURGICAL HISTORY OF -       venous ablation right leg     SURGICAL HISTORY OF -       excision of AIN 2 (intraanal)       FAMILY HISTORY:   Family History   Problem Relation Age of Onset     Alcohol/Drug Father      Cancer Father         skin     Hypertension Father      Lipids Father      Neurologic Disorder Father         TIA's; dementia     Respiratory Father         copd     Heart Disease Father         CHF; a. fib/brain hemorrhage     Hypertension Maternal Grandmother      Heart Disease Maternal Grandmother         brain hemorrhage     Diabetes Maternal Grandfather      C.A.D. Maternal Grandfather      Cancer Maternal Grandfather         Bladder     Psychotic Disorder Sister         depression       SOCIAL HISTORY:   Social History     Tobacco Use     Smoking status: Former Smoker     Last attempt to quit: 1979     Years since quittin.1     Smokeless tobacco: Never Used     Tobacco comment: smoked a little as a teenager   Substance Use Topics     Alcohol use: Yes     Alcohol/week: 1.7 - 2.5 standard drinks     Types: 2 - 3 Standard drinks or equivalent per week     Comment: 1 glass per week       REVIEW OF SYSTEMS: Review Of Systems  Skin: negative  Eyes: Wears corrective lenses  Ears/Nose/Throat: negative  Respiratory: No shortness of breath, dyspnea on exertion, cough, or hemoptysis  Cardiovascular: negative  Gastrointestinal: negative  Genitourinary: negative  Musculoskeletal: Arthralgias and  myalgias.  No joint swelling  Neurologic: headaches  Psychiatric: negative  Hematologic/Lymphatic/Immunologic: negative  Endocrine: negative      Vital signs:  Breastfeeding No     Current Outpatient Medications   Medication Sig Dispense Refill     Cetirizine HCl (ZYRTEC ALLERGY PO) Take  by mouth. 1 tablet daily as needed       fluticasone (FLONASE) 50 MCG/ACT spray USE TWO SPRAY(S) IN EACH NOSTRIL ONCE DAILY 1 Bottle 11     ibuprofen (ADVIL/MOTRIN) 600 MG tablet Take 1 tablet (600 mg) by mouth every 6 hours as needed for pain (mild) 30 tablet 0     NEW MED Calcium supplement.  Unsure of dose       omega 3 1000 MG CAPS Take 1 g by mouth daily 90 capsule 0     Allergies:  NKDA    PHYSICAL EXAM:  General: Pleasant, NAD.   HEENT: Normocephalic, atraumatic, external ears and nose normal.  Wears corrective lenses  Respiratory: Normal respiratory effort.   Cardiovascular: Pulse is regular.   Musculoskeletal: Gait and station normal.  The joints of her fingers and toes without deformity.  There is no cyanosis of her nailbeds.   EXTREMITIES: Right lower extremity: 4 mm varicosities with reticular veins about the right medial calf.  There is tenderness overlying this area but no induration or erythema is appreciated.  There is tenderness along the distal posterior medial right thigh but no visible varicose veins, induration, ecchymosis or erythema are appreciated.  There is no significant edema of her ankle or venous stasis changes.    PULSES: R/L (3=normal pulse, 0=no palpable pulse) dorsalis pedis: 3/3; posterior tibial: 3/3.      Neurologic: Grossly normal  Psychiatric: Mood, affect, judgment and insight are normal     Venous Duplex Ultrasound:   Right lower extremity: No evidence of deep vein thrombosis.  Her right common femoral through mid femoral veins are incompetent but the distal femoral and popliteal veins are competent.  The right great saphenous vein is not visualized from the saphenofemoral junction to the  proximal calf, consistent with previous ablation.  The right below-knee great saphenous vein is patent and competent.  The right small saphenous vein and vein of Giacomini are competent.  The right anterior accessory saphenous vein is not visualized.  There are multiple incompetent vein branches coursing from the below-knee great saphenous vein measuring up to 3.7 mm in diameter with reflux time of 4.8 seconds.    The left common femoral vein shows normal phasicity, compression augmentation with no evidence of deep vein thrombosis.    ASSESSMENT:  Recurrent right lower extremity pain and varicose veins.  There is no significant axial incompetence but she does have several incompetent vein branches coursing from the below-knee great saphenous vein to the area of her pain.    We discussed options of continued conservative management with use of compression hose, leg elevation, dietary measures, exercise and weight control.  She has pursued these measures for nearly a year.  Risk of conservative measures including superficial thrombophlebitis, bleeding and progression of the venous insufficiency were discussed.    If she chose treatment, she would be a candidate for medically necessary ultrasound-guided sclerotherapy of the incompetent, recurrent vein branches on her medial calf.  I would also image her distal medial right thigh to ensure that there were no vein branches that needed to be treated at this location.    Details of sclerotherapy including risks of deep vein thrombosis, allergic reaction, ulceration, hyperpigmentation and trapped blood were discussed.  The patient voiced understanding and her questions were answered.    PLAN:  The patient will think about her options and let us know how she would like to proceed.  Estimates were given.     Dio Crawford MD      [facility] NEW PATIENT:            Again, thank you for allowing me to participate in the care of your patient.         Sincerely,        Dio Crawford MD

## 2020-02-12 ENCOUNTER — TRANSFERRED RECORDS (OUTPATIENT)
Dept: HEALTH INFORMATION MANAGEMENT | Facility: CLINIC | Age: 55
End: 2020-02-12

## 2020-02-12 ENCOUNTER — HOSPITAL PATHOLOGY (OUTPATIENT)
Dept: OTHER | Facility: CLINIC | Age: 55
End: 2020-02-12

## 2020-02-17 LAB — COPATH REPORT: NORMAL

## 2020-02-18 ENCOUNTER — THERAPY VISIT (OUTPATIENT)
Dept: PHYSICAL THERAPY | Facility: CLINIC | Age: 55
End: 2020-02-18
Payer: COMMERCIAL

## 2020-02-18 DIAGNOSIS — M25.551 RIGHT HIP PAIN: ICD-10-CM

## 2020-02-18 PROCEDURE — 97112 NEUROMUSCULAR REEDUCATION: CPT | Mod: GP | Performed by: PHYSICAL THERAPIST

## 2020-02-18 PROCEDURE — 97110 THERAPEUTIC EXERCISES: CPT | Mod: GP | Performed by: PHYSICAL THERAPIST

## 2020-02-19 DIAGNOSIS — Z13.6 CARDIOVASCULAR SCREENING; LDL GOAL LESS THAN 160: ICD-10-CM

## 2020-02-19 DIAGNOSIS — Z11.4 SCREENING FOR HUMAN IMMUNODEFICIENCY VIRUS WITHOUT PRESENCE OF RISK FACTORS: ICD-10-CM

## 2020-02-19 DIAGNOSIS — Z13.1 SCREENING FOR DIABETES MELLITUS: ICD-10-CM

## 2020-02-19 LAB
CHOLEST SERPL-MCNC: 258 MG/DL
GLUCOSE SERPL-MCNC: 94 MG/DL (ref 70–99)
HDLC SERPL-MCNC: 111 MG/DL
LDLC SERPL CALC-MCNC: 140 MG/DL
NONHDLC SERPL-MCNC: 147 MG/DL
TRIGL SERPL-MCNC: 37 MG/DL

## 2020-02-19 PROCEDURE — 80061 LIPID PANEL: CPT | Performed by: FAMILY MEDICINE

## 2020-02-19 PROCEDURE — 36415 COLL VENOUS BLD VENIPUNCTURE: CPT | Performed by: FAMILY MEDICINE

## 2020-02-19 PROCEDURE — 82947 ASSAY GLUCOSE BLOOD QUANT: CPT | Performed by: FAMILY MEDICINE

## 2020-02-19 PROCEDURE — 87389 HIV-1 AG W/HIV-1&-2 AB AG IA: CPT | Performed by: FAMILY MEDICINE

## 2020-02-20 LAB — HIV 1+2 AB+HIV1 P24 AG SERPL QL IA: NONREACTIVE

## 2020-03-03 ENCOUNTER — THERAPY VISIT (OUTPATIENT)
Dept: PHYSICAL THERAPY | Facility: CLINIC | Age: 55
End: 2020-03-03
Payer: COMMERCIAL

## 2020-03-03 DIAGNOSIS — M25.551 RIGHT HIP PAIN: ICD-10-CM

## 2020-03-03 PROCEDURE — 97112 NEUROMUSCULAR REEDUCATION: CPT | Mod: GP | Performed by: PHYSICAL THERAPIST

## 2020-03-03 PROCEDURE — 97110 THERAPEUTIC EXERCISES: CPT | Mod: GP | Performed by: PHYSICAL THERAPIST

## 2020-03-25 ENCOUNTER — TELEPHONE (OUTPATIENT)
Dept: VASCULAR SURGERY | Facility: CLINIC | Age: 55
End: 2020-03-25

## 2020-03-25 NOTE — TELEPHONE ENCOUNTER
[facility]    Who is the name of the provider?:  Yvette      What is the location you see this provider at?: [place]    Reason for call:  Needs to order new compression stockings.      Can we leave a detailed message on this number?  YES

## 2020-03-25 NOTE — TELEPHONE ENCOUNTER
"Called pt back. Pt would like 2 pairs of closed-toe compression hose - 1 pair of natural thigh high and 1 pair of black knee high. Faxed order form to [facility]. Fax confirmed.  Also added pt to our \"reschedule\" list so we can call her when the clinic opens back up to schedule her U/S guided sclerotherapy.    Pt in agreement with plan and had no further questions.  "

## 2020-06-26 NOTE — TELEPHONE ENCOUNTER
Aviva Zamudio spoke with patient on 3/6/20, patient stated she will think about having the treatment done and will call back if she would like to schedule.    Fide Gibbons, Surgery Scheduler  [facility]  [facility]

## 2020-09-14 ENCOUNTER — HOSPITAL PATHOLOGY (OUTPATIENT)
Dept: OTHER | Facility: CLINIC | Age: 55
End: 2020-09-14

## 2020-09-14 ENCOUNTER — TRANSFERRED RECORDS (OUTPATIENT)
Dept: HEALTH INFORMATION MANAGEMENT | Facility: CLINIC | Age: 55
End: 2020-09-14

## 2020-09-17 LAB — COPATH REPORT: NORMAL

## 2020-10-22 ENCOUNTER — OFFICE VISIT (OUTPATIENT)
Dept: VASCULAR SURGERY | Facility: CLINIC | Age: 55
End: 2020-10-22
Payer: COMMERCIAL

## 2020-10-22 DIAGNOSIS — I83.891 SYMPTOMATIC VARICOSE VEINS, RIGHT: Primary | ICD-10-CM

## 2020-10-22 PROCEDURE — 99213 OFFICE O/P EST LOW 20 MIN: CPT | Performed by: SURGERY

## 2020-10-22 NOTE — LETTER
10/22/2020         RE: Graeme Johnson  [address]  [facility] [place] [address]        Dear Colleague,    Thank you for referring your patient, Graeme Johnson, to the [facility] [place]. Please see a copy of my visit note below.    [facility] Note    Graeme Johnson presents in follow-up of recurrent right lower extremity pain and varicose veins.  Please see my consultation of 2/5/2020 for details.  She had planned to have treatment but due to the Covid pandemic and the office closing, she did not have treatment.  She returns today for further evaluation and treatment options.    Her complaints are of pain which she describes as an aching, tiredness and heaviness on her right medial calf but also in the distal medial right thigh.  The distal medial right thigh pain is worse when she sits at her desk for long periods of time, causes her to have to get up and walk and to use ibuprofen on an as-needed basis for the pain.  This is occurred despite the use of compression hose, exercise, dietary measures and leg elevation.    She has no history of deep vein thrombosis or superficial thrombophlebitis.    Physical Exam  General: Pleasant female in no acute distress  Extremities: Right lower extremity: 4 mm varicosities in a cluster about the proximal posterior medial right calf.    There is a 3 mm palpable varicosity coursing from the distal posterior medial right thigh down to the area of the varicosity on the right medial calf.  Careful palpation of the distal posterior medial thigh more deeply does not reveal any cords or masses.    There is trace edema of the right ankle but no stasis hyperpigmentation.    Previous right lower extremity venous ultrasound revealed a patent below-knee right great saphenous vein with tributaries coursing from it.  The thigh great saphenous vein was closed from previous ablation.    Assessment:  Persistent/worsening right distal posterior  medial thigh pain and recurrent varicose veins.  I feel we should obtain an up-to-date venous ultrasound of her right lower extremity to more closely assess the area of her pain.  Treatment will be based on ultrasound findings.    From the previous findings, we had planned ultrasound-guided sclerotherapy of the recurrent right medial calf tributaries and the below-knee great saphenous vein.  We will await ultrasound results to determine future treatment.    Plan:  See above    Dio Crawford MD    Dictated using Dragon voice recognition software which may result in transcription errors              Again, thank you for allowing me to participate in the care of your patient.        Sincerely,        Dio Crawford MD

## 2020-10-22 NOTE — PROGRESS NOTES
[facility] Note    Graeme Johnson presents in follow-up of recurrent right lower extremity pain and varicose veins.  Please see my consultation of 2/5/2020 for details.  She had planned to have treatment but due to the Covid pandemic and the office closing, she did not have treatment.  She returns today for further evaluation and treatment options.    Her complaints are of pain which she describes as an aching, tiredness and heaviness on her right medial calf but also in the distal medial right thigh.  The distal medial right thigh pain is worse when she sits at her desk for long periods of time, causes her to have to get up and walk and to use ibuprofen on an as-needed basis for the pain.  This is occurred despite the use of compression hose, exercise, dietary measures and leg elevation.    She has no history of deep vein thrombosis or superficial thrombophlebitis.    Physical Exam  General: Pleasant female in no acute distress  Extremities: Right lower extremity: 4 mm varicosities in a cluster about the proximal posterior medial right calf.    There is a 3 mm palpable varicosity coursing from the distal posterior medial right thigh down to the area of the varicosity on the right medial calf.  Careful palpation of the distal posterior medial thigh more deeply does not reveal any cords or masses.    There is trace edema of the right ankle but no stasis hyperpigmentation.    Previous right lower extremity venous ultrasound revealed a patent below-knee right great saphenous vein with tributaries coursing from it.  The thigh great saphenous vein was closed from previous ablation.    Assessment:  Persistent/worsening right distal posterior medial thigh pain and recurrent varicose veins.  I feel we should obtain an up-to-date venous ultrasound of her right lower extremity to more closely assess the area of her pain.  Treatment will be based on ultrasound findings.    From the previous findings, we had planned  ultrasound-guided sclerotherapy of the recurrent right medial calf tributaries and the below-knee great saphenous vein.  We will await ultrasound results to determine future treatment.    Plan:  See above    Dio Crawford MD    Dictated using Dragon voice recognition software which may result in transcription errors

## 2020-11-02 ENCOUNTER — VIRTUAL VISIT (OUTPATIENT)
Dept: VASCULAR SURGERY | Facility: CLINIC | Age: 55
End: 2020-11-02
Attending: SURGERY
Payer: COMMERCIAL

## 2020-11-02 ENCOUNTER — ANCILLARY PROCEDURE (OUTPATIENT)
Dept: ULTRASOUND IMAGING | Facility: CLINIC | Age: 55
End: 2020-11-02
Attending: SURGERY
Payer: COMMERCIAL

## 2020-11-02 DIAGNOSIS — I83.891 SYMPTOMATIC VARICOSE VEINS, RIGHT: ICD-10-CM

## 2020-11-02 DIAGNOSIS — I83.891 SYMPTOMATIC VARICOSE VEINS, RIGHT: Primary | ICD-10-CM

## 2020-11-02 PROCEDURE — 93971 EXTREMITY STUDY: CPT | Mod: RT | Performed by: SURGERY

## 2020-11-02 PROCEDURE — 99213 OFFICE O/P EST LOW 20 MIN: CPT | Mod: 95 | Performed by: SURGERY

## 2020-11-02 NOTE — LETTER
"    11/2/2020         RE: Graeme Johnson  [address] [facility] [address]        Dear Colleague,    Thank you for referring your patient, Graeme Johnson, to the [facility] [place]. Please see a copy of my visit note below.    Graeme Johnson is a 55 year old female who is being evaluated via a billable video visit.      The patient has been notified of following:     \"This video visit will be conducted via a call between you and your physician/provider. We have found that certain health care needs can be provided without the need for an in-person physical exam.  This service lets us provide the care you need with a video conversation.  If a prescription is necessary we can send it directly to your pharmacy.  If lab work is needed we can place an order for that and you can then stop by our lab to have the test done at a later time.    Video visits are billed at different rates depending on your insurance coverage.  Please reach out to your insurance provider with any questions.    If during the course of the call the physician/provider feels a video visit is not appropriate, you will not be charged for this service.\"    Patient has given verbal consent for Video visit? Yes  How would you like to obtain your AVS? MyChart  If you are dropped from the video visit, the video invite should be resent to: Text to cell phone: [phone]  Will anyone else be joining your video visit? No        Video-Visit Details    Type of service:  Video Visit    Video Start Time: 3:01 PM  Video End Time: 3:17 PM    Originating Location (pt. Location): Other Work    Distant Location (provider location):  [facility] [place]     Platform used for Video Visit: [facility]     [facility] Clinic Note    Graeme Johnson presents in follow-up of right lower extremity pain and varicose veins.  Please see my previous office notes from 2/5/2020 and 10/22/2020 for details.  Because of " pain in her right distal posterior medial thigh, we repeated the ultrasound to see if there were visible abnormalities that might explain this discomfort.  We will discuss this ultrasound during today's video visit.      Physical Exam  General: Pleasant female in no acute distress.  Blood pressure 126/77, pulse 63 and regular  Extremities: Right lower extremity: 3 mm varicosity palpable along the distal posterior medial right thigh down to the area of the varicosity on the right medial calf.    She has full movement of varicosities about the right proximal posterior medial calf.  There is trace ankle edema but no stasis hyperpigmentation.    Ultrasound: (11-2-2020)  Right lower extremity: No evidence of deep vein thrombosis.  The right common femoral vein is incompetent but the remaining deep vein valves are competent.    The right great saphenous vein is surgically absent from just distal to the saphenofemoral junction to the mid calf.  The mid calf to ankle great saphenous vein is patent and competent.    The right small saphenous vein, vein of Giacomini are competent.    The right and accessory saphenous vein is not visualized.    There is a 3.9 mm diameter incompetent  14 cm from the right medial malleolus communicating with the great saphenous vein.    There is a 2.1 mm diameter incompetent vein branch coursing from the stump of the great saphenous vein down to the medial calf communicating with an incompetent vein branch measuring 3.4 mm in diameter in the proximal medial calf.  This vein branch courses distally on the medial calf.    The left common femoral vein demonstrates normal phasicity, compression and augmentation without deep vein thrombosis.    Assessment:  Ongoing right distal posterior medial thigh and right medial leg pain which has awakened the patient from sleep at night and continues to trouble her when she sits for long periods of time, especially when driving in her car.  She has  been wearing compression hose, exercising, controlling her weight and elevating her leg to no avail.    She understands it continued conservative measures is an option with low risk of superficial thrombophlebitis, bleeding and progression of the disease process.    If she wishes to pursue treatment, she would be a candidate for medically necessary ultrasound-guided sclerotherapy of the 3.4 mm diameter incompetent vein branch with reflux time of 8.8 seconds on the medial calf as well as treatment of the vein branch coursing down the right medial thigh with reflux time of 660 ms.  Details of sclerotherapy including risks of allergic reaction, deep ecchymosis, ulceration, hyperpigmentation, superficial thrombophlebitis and the need for more than 1 session of sclerotherapy were discussed.  The patient voiced understanding and wishes to proceed.    Plan:  Ultrasound-guided, medic necessary sclerotherapy right lower extremity.      KEITH Crawford MD    Dictated using Dragon voice recognition software which may result in transcription errors          Again, thank you for allowing me to participate in the care of your patient.        Sincerely,        Dio Crawford MD

## 2020-11-02 NOTE — Clinical Note
Ultrasound-guided, medically necessary sclerotherapy right lower extremity, 2 sessions (I believe she is already been approved but had to cancel due to Covid)

## 2020-11-02 NOTE — PROGRESS NOTES
"Graeme Johnson is a 55 year old female who is being evaluated via a billable video visit.      The patient has been notified of following:     \"This video visit will be conducted via a call between you and your physician/provider. We have found that certain health care needs can be provided without the need for an in-person physical exam.  This service lets us provide the care you need with a video conversation.  If a prescription is necessary we can send it directly to your pharmacy.  If lab work is needed we can place an order for that and you can then stop by our lab to have the test done at a later time.    Video visits are billed at different rates depending on your insurance coverage.  Please reach out to your insurance provider with any questions.    If during the course of the call the physician/provider feels a video visit is not appropriate, you will not be charged for this service.\"    Patient has given verbal consent for Video visit? Yes  How would you like to obtain your AVS? MyChart  If you are dropped from the video visit, the video invite should be resent to: Text to cell phone: [phone]  Will anyone else be joining your video visit? No        Video-Visit Details    Type of service:  Video Visit    Video Start Time: 3:01 PM  Video End Time: 3:17 PM    Originating Location (pt. Location): Other Work    Distant Location (provider location):  [facility] [place]     Platform used for Video Visit: [facility]     [facility] Clinic Note    Graeme Johnson presents in follow-up of right lower extremity pain and varicose veins.  Please see my previous office notes from 2/5/2020 and 10/22/2020 for details.  Because of pain in her right distal posterior medial thigh, we repeated the ultrasound to see if there were visible abnormalities that might explain this discomfort.  We will discuss this ultrasound during today's video visit.      Physical Exam  General: Pleasant female in no " acute distress.  Blood pressure 126/77, pulse 63 and regular  Extremities: Right lower extremity: 3 mm varicosity palpable along the distal posterior medial right thigh down to the area of the varicosity on the right medial calf.    She has full movement of varicosities about the right proximal posterior medial calf.  There is trace ankle edema but no stasis hyperpigmentation.    Ultrasound: (11-2-2020)  Right lower extremity: No evidence of deep vein thrombosis.  The right common femoral vein is incompetent but the remaining deep vein valves are competent.    The right great saphenous vein is surgically absent from just distal to the saphenofemoral junction to the mid calf.  The mid calf to ankle great saphenous vein is patent and competent.    The right small saphenous vein, vein of Giacomini are competent.    The right and accessory saphenous vein is not visualized.    There is a 3.9 mm diameter incompetent  14 cm from the right medial malleolus communicating with the great saphenous vein.    There is a 2.1 mm diameter incompetent vein branch coursing from the stump of the great saphenous vein down to the medial calf communicating with an incompetent vein branch measuring 3.4 mm in diameter in the proximal medial calf.  This vein branch courses distally on the medial calf.    The left common femoral vein demonstrates normal phasicity, compression and augmentation without deep vein thrombosis.    Assessment:  Ongoing right distal posterior medial thigh and right medial leg pain which has awakened the patient from sleep at night and continues to trouble her when she sits for long periods of time, especially when driving in her car.  She has been wearing compression hose, exercising, controlling her weight and elevating her leg to no avail.    She understands it continued conservative measures is an option with low risk of superficial thrombophlebitis, bleeding and progression of the disease process.    If  she wishes to pursue treatment, she would be a candidate for medically necessary ultrasound-guided sclerotherapy of the 3.4 mm diameter incompetent vein branch with reflux time of 8.8 seconds on the medial calf as well as treatment of the vein branch coursing down the right medial thigh with reflux time of 660 ms.  Details of sclerotherapy including risks of allergic reaction, deep ecchymosis, ulceration, hyperpigmentation, superficial thrombophlebitis and the need for more than 1 session of sclerotherapy were discussed.  The patient voiced understanding and wishes to proceed.    Plan:  Ultrasound-guided, medic necessary sclerotherapy right lower extremity.      KEITH Crawford MD    Dictated using Dragon voice recognition software which may result in transcription errors

## 2020-12-18 ENCOUNTER — OFFICE VISIT (OUTPATIENT)
Dept: VASCULAR SURGERY | Facility: CLINIC | Age: 55
End: 2020-12-18
Payer: COMMERCIAL

## 2020-12-18 DIAGNOSIS — I78.1 SPIDER VEINS: Primary | ICD-10-CM

## 2020-12-18 DIAGNOSIS — I83.891 SYMPTOMATIC VARICOSE VEINS, RIGHT: Primary | ICD-10-CM

## 2020-12-18 DIAGNOSIS — I83.811 VARICOSE VEINS OF RIGHT LOWER EXTREMITY WITH PAIN: ICD-10-CM

## 2020-12-18 PROCEDURE — 36471 NJX SCLRSNT MLT INCMPTNT VN: CPT | Mod: RT | Performed by: SURGERY

## 2020-12-18 PROCEDURE — A6533 GC STOCKING THIGHLNGTH 18-30: HCPCS

## 2020-12-18 PROCEDURE — 76942 ECHO GUIDE FOR BIOPSY: CPT | Performed by: SURGERY

## 2020-12-18 NOTE — PROGRESS NOTES
[facility] Procedure Note    Graeme Johnson  December 18, 2020    Indications:  Right leg pain and varicose veins recalcitrant to conservative measures    Procedure:  Ultrasound guided, medically necessary sclerotherapy incompetent, symptomatic right thigh and medial calf varicose veins    Procedure description  Details of procedure including risks of allergic reaction, deep vein thrombosis, recanalization of the close veins, superficial thrombophlebitis and ulceration as well as hyperpigmentation were discussed.  The patient voiced understanding and wished to proceed.  Informed consent was obtained.    I imaged the patient's right thigh and noted the tributary coursing down the distal medial right thigh which supplied the varicosities on the medial calf.  I accessed the vein approximately 8 cm cephalad of the knee and injected 2 mL of 1% polidocanol foam filling the vein and sending it into tight spasm.    I then imaged the right medial calf and accessed the veins at this level and injected 1 mL of foam filling them nicely.    I had the patient perform ankle pumps on the table.  We cleaned her leg, had her don thigh-high compression and then had her walk for 10 minutes prior to getting a car drive home.  Post procedure instructions were given.    She tolerated the procedure well without evidence of allergic reaction or other complications and will return in 1 month in follow-up.    There were no vitals taken for this visit.    Flowsheet Data 10/22/2020   Side: Right       Sclerotherapy    Date/Time: 12/18/2020 4:13 PM  Performed by: Dio Crawford MD  Authorized by: Dio Crawford MD     Time out: Immediately prior to the procedure a time out was called    Type:  Medically Necessary  Vein:  Multiple Veins  Yes    Procedure side:  Right  Solution/Amount:  1% POLIDOCANOL  Syringes::  2  Patient tolerance:  Patient tolerated the procedure well with no immediate complications  Wrap/Hose:   Mayo Crawford MD    Dictated using Dragon voice recognition software which may result in transcription errors

## 2020-12-18 NOTE — PROGRESS NOTES
Patient purchased 1 pair(s) of BEIGE, THIGH high, CLOSED-toe, size 3 compression hose from the clinic today.     Informed patient all compression hose purchases are final.    Seema Mccarty MA on 12/18/2020 at 4:17 PM

## 2020-12-18 NOTE — LETTER
12/18/2020         RE: Graeme Johnson  [address]  [facility] [address]        Dear Colleague,    Thank you for referring your patient, Graeme Johnson, to the [facility] [place]. Please see a copy of my visit note below.        [facility] Procedure Note    Graeme Johnson  December 18, 2020    Indications:  Right leg pain and varicose veins recalcitrant to conservative measures    Procedure:  Ultrasound guided, medically necessary sclerotherapy incompetent, symptomatic right thigh and medial calf varicose veins    Procedure description  Details of procedure including risks of allergic reaction, deep vein thrombosis, recanalization of the close veins, superficial thrombophlebitis and ulceration as well as hyperpigmentation were discussed.  The patient voiced understanding and wished to proceed.  Informed consent was obtained.    I imaged the patient's right thigh and noted the tributary coursing down the distal medial right thigh which supplied the varicosities on the medial calf.  I accessed the vein approximately 8 cm cephalad of the knee and injected 2 mL of 1% polidocanol foam filling the vein and sending it into tight spasm.    I then imaged the right medial calf and accessed the veins at this level and injected 1 mL of foam filling them nicely.    I had the patient perform ankle pumps on the table.  We cleaned her leg, had her don thigh-high compression and then had her walk for 10 minutes prior to getting a car drive home.  Post procedure instructions were given.    She tolerated the procedure well without evidence of allergic reaction or other complications and will return in 1 month in follow-up.    There were no vitals taken for this visit.    Flowsheet Data 10/22/2020   Side: Right       Sclerotherapy    Date/Time: 12/18/2020 4:13 PM  Performed by: Dio Crawford MD  Authorized by: Dio Crawford MD     Time out: Immediately prior to  the procedure a time out was called    Type:  Medically Necessary  Vein:  Multiple Veins  Yes    Procedure side:  Right  Solution/Amount:  1% POLIDOCANOL  Syringes::  2  Patient tolerance:  Patient tolerated the procedure well with no immediate complications  Wrap/Hose:  Mayo Crawford MD    Dictated using Dragon voice recognition software which may result in transcription errors      Again, thank you for allowing me to participate in the care of your patient.        Sincerely,        Dio Crawford MD

## 2021-01-05 ENCOUNTER — TELEPHONE (OUTPATIENT)
Dept: VASCULAR SURGERY | Facility: CLINIC | Age: 56
End: 2021-01-05

## 2021-01-05 NOTE — TELEPHONE ENCOUNTER
Graeme had US guided sclero on 12/18/20 with [facility] in [place].  She states that today she has a knot in her calf and it is very tender to touch around that area. Please advise....  Okay to LM

## 2021-01-05 NOTE — TELEPHONE ENCOUNTER
"Spoke with patient, she states right  lower leg \"knot\" is tender, denies redness or warmth in the area. Reviewed with patient discharge instructions. Common things to expect include may have firm, red, tender and swelling in the area of treatment. Pt verbalized understanding and recommended compression hose, Ibuprofen, walking to see if this helps. Pt has a 4 week f/u on 1/15/21.   "

## 2021-01-15 ENCOUNTER — HEALTH MAINTENANCE LETTER (OUTPATIENT)
Age: 56
End: 2021-01-15

## 2021-01-15 ENCOUNTER — OFFICE VISIT (OUTPATIENT)
Dept: VASCULAR SURGERY | Facility: CLINIC | Age: 56
End: 2021-01-15
Payer: COMMERCIAL

## 2021-01-15 DIAGNOSIS — I83.891 SYMPTOMATIC VARICOSE VEINS, RIGHT: Primary | ICD-10-CM

## 2021-01-15 PROCEDURE — 99213 OFFICE O/P EST LOW 20 MIN: CPT | Performed by: SURGERY

## 2021-01-15 NOTE — PROGRESS NOTES
[facility] office note  Graeme Johnson returns in 1 month follow-up of ultrasound-guided, medically necessary sclerotherapy of asymptomatic, incompetent varicosity of her distal medial right thigh and proximal medial calf.  She states that on about January 5 she noted redness and tenderness along the right medial calf for which she phoned our office.  She was reassured that this was expected following the sclerotherapy.  She states that this has slowly improved with time.    She does admit to some discomfort in the posterior medial right thigh, especially after sitting for long periods of time.  She states that this pain is little different from what she had prior to the sclerotherapy.  It certainly is no worse.    Physical exam  General: Pleasant female in no acute distress  Right lower extremity: There is mild hyperpigmentation from the mid to the distal right thigh and then from the proximal to just cephalad of the mid calf.  There is more intense ecchymosis and induration of the right medial calf.    I performed an ultrasound of the right leg and noted that the treated vein is noncompressible and closed.  The same is noted on the right medial calf with a noncompressible vein.    Impression  Satisfactory progress following ultrasound-guided sclerotherapy of a right distal medial thigh and right proximal medial calf varicose vein.  With the induration of the medial calf being prominent, I cleaned the area with alcohol, made stab wounds with an 18-gauge needle and expressed thrombus.  The vein on the right medial thigh is deeper and I did not feel that it was superficial enough to be amenable to having some of the thrombus drained.    I informed the patient that it will take 2 to 3 months for her to really know if her preoperative pain will be improved with the treatment.  I also told her that it would take 3 months or more for the induration along the thigh to resolve.  She may begin gentle massage of the area  to help this break-up more quickly.    Plan  She will call with the proximal report in about 6 weeks.  She will call sooner if she has concerns or questions.    KEITH Crawford MD    Dictated using Dragon voice recognition software which may result in transcription errors

## 2021-01-15 NOTE — LETTER
1/15/2021         RE: Graeme Johnson  [address] [facility] [address]        Dear Colleague,    Thank you for referring your patient, Graeme Johnson, to the [facility] [place]. Please see a copy of my visit note below.    [facility] office note  Graeme Johnson returns in 1 month follow-up of ultrasound-guided, medically necessary sclerotherapy of asymptomatic, incompetent varicosity of her distal medial right thigh and proximal medial calf.  She states that on about January 5 she noted redness and tenderness along the right medial calf for which she phoned our office.  She was reassured that this was expected following the sclerotherapy.  She states that this has slowly improved with time.    She does admit to some discomfort in the posterior medial right thigh, especially after sitting for long periods of time.  She states that this pain is little different from what she had prior to the sclerotherapy.  It certainly is no worse.    Physical exam  General: Pleasant female in no acute distress  Right lower extremity: There is mild hyperpigmentation from the mid to the distal right thigh and then from the proximal to just cephalad of the mid calf.  There is more intense ecchymosis and induration of the right medial calf.    I performed an ultrasound of the right leg and noted that the treated vein is noncompressible and closed.  The same is noted on the right medial calf with a noncompressible vein.    Impression  Satisfactory progress following ultrasound-guided sclerotherapy of a right distal medial thigh and right proximal medial calf varicose vein.  With the induration of the medial calf being prominent, I cleaned the area with alcohol, made stab wounds with an 18-gauge needle and expressed thrombus.  The vein on the right medial thigh is deeper and I did not feel that it was superficial enough to be amenable to having some of the thrombus drained.    I informed the  patient that it will take 2 to 3 months for her to really know if her preoperative pain will be improved with the treatment.  I also told her that it would take 3 months or more for the induration along the thigh to resolve.  She may begin gentle massage of the area to help this break-up more quickly.    Plan  She will call with the proximal report in about 6 weeks.  She will call sooner if she has concerns or questions.    KEITH Crawford MD    Dictated using Dragon voice recognition software which may result in transcription errors      Again, thank you for allowing me to participate in the care of your patient.        Sincerely,        Dio Crawfrod MD

## 2021-02-28 NOTE — PROGRESS NOTES
Subjective:  HPI  Physical Exam                    Objective:  System    Physical Exam    General     ROS    Assessment/Plan:    DISCHARGE REPORT    Progress reporting period is from 03/03/2020.       SUBJECTIVE  Subjective changes noted by patient:    Subjective: PT 10 min late. Overall doing pretty well. Did bump up weight on torso twist and abd/add. LB and hips got sore so thinks from torso twist. Lasted ~ 24-48 hours.     Current pain level is NA  .     Previous pain level was  NA  .   Changes in function:  Yes (See Goal flowsheet attached for changes in current functional level)  Adverse reaction to treatment or activity: None    OBJECTIVE  Changes noted in objective findings:  Patient has failed to return to therapy so current objective findings are unknown.  Objective: TTP R TFL area. Instructed to add soft tissue work with lacrosse ball or foam roller. Advanced HEP.      ASSESSMENT/PLAN  Updated problem list and treatment plan:   STG/LTGs have been met or progress has been made towards goals:  Yes (See Goal flow sheet completed today.)  Assessment of Progress: The patient has not returned to therapy. Current status is unknown.  Self Management Plans:  Patient has been instructed in a home treatment program.  Patient  has been instructed in self management of symptoms.    Graeme continues to require the following intervention to meet STG and LTG's:  PT intervention is no longer required to meet STG/LTG.    Recommendations:  This patient is ready to be discharged from therapy and continue their home treatment program.    Please refer to the daily flowsheet for treatment today, total treatment time and time spent performing 1:1 timed codes.

## 2021-10-24 ENCOUNTER — HEALTH MAINTENANCE LETTER (OUTPATIENT)
Age: 56
End: 2021-10-24

## 2022-02-13 ENCOUNTER — HEALTH MAINTENANCE LETTER (OUTPATIENT)
Age: 57
End: 2022-02-13

## 2022-10-16 ENCOUNTER — HEALTH MAINTENANCE LETTER (OUTPATIENT)
Age: 57
End: 2022-10-16

## 2023-03-26 ENCOUNTER — HEALTH MAINTENANCE LETTER (OUTPATIENT)
Age: 58
End: 2023-03-26

## 2023-06-17 ENCOUNTER — HEALTH MAINTENANCE LETTER (OUTPATIENT)
Age: 58
End: 2023-06-17

## 2023-07-02 NOTE — MR AVS SNAPSHOT
After Visit Summary   9/26/2017    Graeme Johnson    MRN: [id number]           Patient Information     Date Of Birth          1965        Visit Information        Provider Department      9/26/2017 8:30 AM Nessa Contreras MD [facility]        Today's Diagnoses     Abdominal hernia without obstruction and without gangrene, recurrence not specified, unspecified hernia type    -  1       Follow-ups after your visit        Additional Services     GENERAL SURG ADULT REFERRAL       Your provider has referred you to: [facility]: [facility] [place] [phone]   [email or web address]    Please be aware that coverage of these services is subject to the terms and limitations of your health insurance plan.  Call member services at your health plan with any benefit or coverage questions.      Please bring the following with you to your appointment:    (1) Any X-Rays, CTs or MRIs which have been performed.  Contact the facility where they were done to arrange for  prior to your scheduled appointment.   (2) List of current medications   (3) This referral request   (4) Any documents/labs given to you for this referral                  Who to contact     If you have questions or need follow up information about today's clinic visit or your schedule please contact [facility] directly at [phone].  Normal or non-critical lab and imaging results will be communicated to you by MyChart, letter or phone within 4 business days after the clinic has received the results. If you do not hear from us within 7 days, please contact the clinic through MyChart or phone. If you have a critical or abnormal lab result, we will notify you by phone as soon as possible.  Submit refill requests through [email or web address] or call your pharmacy and they will forward the refill request to us. Please allow 3 business days for your refill to be completed.     "      Additional Information About Your Visit        MyChart Information     [employer] gives you secure access to your electronic health record. If you see a primary care provider, you can also send messages to your care team and make appointments. If you have questions, please call your primary care clinic.  If you do not have a primary care provider, please call [phone] and they will assist you.        Care EveryWhere ID     This is your Care EveryWhere ID. This could be used by other organizations to access your [facility] medical records  [id number]        Your Vitals Were     Pulse Temperature Respirations Height Pulse Oximetry BMI (Body Mass Index)    60 98.2  F (36.8  C) (Oral) 16 5' 8.25\" (1.734 m) 96% 21.19 kg/m2       Blood Pressure from Last 3 Encounters:   09/26/17 116/62   04/06/17 118/58   04/01/16 100/64    Weight from Last 3 Encounters:   09/26/17 140 lb 6.4 oz (63.7 kg)   04/06/17 142 lb 3.2 oz (64.5 kg)   04/01/16 146 lb 8 oz (66.5 kg)              We Performed the Following     GENERAL SURG ADULT REFERRAL        Primary Care Provider Office Phone # Fax #    Nessa Contreras [phone] [address] [place] [address]        Equal Access to Services     [facility]: Hadii aad ku hadasho Soomaali, waaxda luqadaha, qaybta kaalmada adeegyada, waxay idiin hayaan manoj pascual . So [facility] [phone].    ATENCIÓN: Si habla español, tiene a romero disposición servicios gratuitos de asistencia lingüística. Llame al [phone].    We comply with applicable federal civil rights laws and [place] laws. We do not discriminate on the basis of race, color, national origin, age, disability sex, sexual orientation or gender identity.            Thank you!     Thank you for choosing [facility]  for your care. Our goal is always to provide you with excellent care. Hearing back from our patients is one way we can continue to improve our services. Please take a " few minutes to complete the written survey that you may receive in the mail after your visit with us. Thank you!             Your Updated Medication List - Protect others around you: Learn how to safely use, store and throw away your medicines at www.disposemymeds.org.          This list is accurate as of: 9/26/17  8:50 AM.  Always use your most recent med list.                   Brand Name Dispense Instructions for use Diagnosis    fluticasone 50 MCG/ACT spray    FLONASE    1 Bottle    USE TWO SPRAY(S) IN EACH NOSTRIL ONCE DAILY    Seasonal allergic rhinitis       Multi-vitamin Tabs tablet     100 tablet    Take 1 tablet by mouth daily        NEW MED      Calcium supplement.  Unsure of dose        omega 3 1000 MG Caps     90 capsule    Take 1 g by mouth daily        ZYRTEC ALLERGY PO      Take  by mouth. 1 tablet daily as needed           No Vaccines Administered.

## (undated) DEVICE — GLOVE ESTEEM POWDER FREE SMT 7.5  2D72PT75

## (undated) DEVICE — PREP CHLORAPREP 26ML TINTED ORANGE  260815

## (undated) DEVICE — SU PDS II 0 CT-2 27" Z334H

## (undated) DEVICE — DECANTER VIAL 2006S

## (undated) DEVICE — CLEANSER WOUND IRRISEPT 0.05% CHG IRRISEPT-403

## (undated) DEVICE — NDL 22GA 1.5"

## (undated) DEVICE — BAG CLEAR TRASH 1.3M 39X33" P4040C

## (undated) DEVICE — TUBING SMOKE EVAC 3/8"X10' E3645

## (undated) DEVICE — GLOVE ESTEEM POWDER FREE SMT 6.5  2D72PT65

## (undated) DEVICE — ESU GROUND PAD ADULT W/CORD E7507

## (undated) DEVICE — LINEN TOWEL PACK X10 5473

## (undated) DEVICE — SOL NACL 0.9% IRRIG 1000ML BOTTLE 2F7124

## (undated) DEVICE — NDL BLUNT 18GA 1" W/O FILTER 305181

## (undated) DEVICE — PACK MINOR CUSTOM RIDGES SBA32RMRMA

## (undated) DEVICE — GLOVE PROTEXIS BLUE W/NEU-THERA 7.0  2D73EB70

## (undated) DEVICE — GLOVE PROTEXIS BLUE W/NEU-THERA 8.0  2D73EB80

## (undated) DEVICE — ESU ELEC BLADE 2.75" COATED/INSULATED E1455

## (undated) DEVICE — LINEN FULL SHEET 5511

## (undated) DEVICE — PREFILTER SMOKE EVAC E6330

## (undated) DEVICE — SU PDS II 2-0 CT-2 27"  Z333H

## (undated) DEVICE — DRAPE LAP W/ARMBOARD 29410

## (undated) DEVICE — MESH VENTRALEX HERNIA 1.7" CIRCLE SM W/STRAP 5950007: Type: IMPLANTABLE DEVICE | Site: UMBILICAL | Status: NON-FUNCTIONAL

## (undated) DEVICE — LINEN HALF SHEET 5512

## (undated) DEVICE — SYR 05ML SLIP TIP W/O NDL 309647

## (undated) DEVICE — SU VICRYL 3-0 SH 27" UND J416H

## (undated) DEVICE — SU DERMABOND MINI DHVM12

## (undated) DEVICE — GLOVE PROTEXIS POWDER FREE SMT 6.5  2D72PT65X

## (undated) DEVICE — SU VICRYL 4-0 PS-2 18" UND J496H

## (undated) DEVICE — TUBING SMOKE EVAC ATTACHMENT E3590

## (undated) RX ORDER — LIDOCAINE HYDROCHLORIDE 10 MG/ML
INJECTION, SOLUTION EPIDURAL; INFILTRATION; INTRACAUDAL; PERINEURAL
Status: DISPENSED
Start: 2017-11-13

## (undated) RX ORDER — PROPOFOL 10 MG/ML
INJECTION, EMULSION INTRAVENOUS
Status: DISPENSED
Start: 2017-11-13

## (undated) RX ORDER — FENTANYL CITRATE 50 UG/ML
INJECTION, SOLUTION INTRAMUSCULAR; INTRAVENOUS
Status: DISPENSED
Start: 2017-11-13

## (undated) RX ORDER — IBUPROFEN 600 MG/1
TABLET, FILM COATED ORAL
Status: DISPENSED
Start: 2017-11-13

## (undated) RX ORDER — DEXAMETHASONE SODIUM PHOSPHATE 4 MG/ML
INJECTION, SOLUTION INTRA-ARTICULAR; INTRALESIONAL; INTRAMUSCULAR; INTRAVENOUS; SOFT TISSUE
Status: DISPENSED
Start: 2017-11-13

## (undated) RX ORDER — HYDROMORPHONE HYDROCHLORIDE 1 MG/ML
INJECTION, SOLUTION INTRAMUSCULAR; INTRAVENOUS; SUBCUTANEOUS
Status: DISPENSED
Start: 2017-11-13

## (undated) RX ORDER — ONDANSETRON 2 MG/ML
INJECTION INTRAMUSCULAR; INTRAVENOUS
Status: DISPENSED
Start: 2017-11-13

## (undated) RX ORDER — BUPIVACAINE HYDROCHLORIDE 2.5 MG/ML
INJECTION, SOLUTION EPIDURAL; INFILTRATION; INTRACAUDAL
Status: DISPENSED
Start: 2017-11-13

## (undated) RX ORDER — CEFAZOLIN SODIUM 2 G/100ML
INJECTION, SOLUTION INTRAVENOUS
Status: DISPENSED
Start: 2017-11-13

## (undated) RX ORDER — GLYCOPYRROLATE 0.2 MG/ML
INJECTION INTRAMUSCULAR; INTRAVENOUS
Status: DISPENSED
Start: 2017-11-13

## (undated) RX ORDER — HYDROCODONE BITARTRATE AND ACETAMINOPHEN 5; 325 MG/1; MG/1
TABLET ORAL
Status: DISPENSED
Start: 2017-11-13